# Patient Record
Sex: FEMALE | Race: BLACK OR AFRICAN AMERICAN | NOT HISPANIC OR LATINO | URBAN - METROPOLITAN AREA
[De-identification: names, ages, dates, MRNs, and addresses within clinical notes are randomized per-mention and may not be internally consistent; named-entity substitution may affect disease eponyms.]

---

## 2017-02-04 ENCOUNTER — INPATIENT (INPATIENT)
Facility: HOSPITAL | Age: 77
LOS: 2 days | Discharge: HOME | DRG: 872 | End: 2017-02-07
Attending: UROLOGY | Admitting: UROLOGY
Payer: MEDICARE

## 2017-02-04 VITALS
OXYGEN SATURATION: 98 % | DIASTOLIC BLOOD PRESSURE: 79 MMHG | RESPIRATION RATE: 20 BRPM | SYSTOLIC BLOOD PRESSURE: 137 MMHG | WEIGHT: 164.91 LBS | TEMPERATURE: 102 F | HEART RATE: 112 BPM

## 2017-02-04 DIAGNOSIS — N13.2 HYDRONEPHROSIS WITH RENAL AND URETERAL CALCULOUS OBSTRUCTION: ICD-10-CM

## 2017-02-04 DIAGNOSIS — A41.9 SEPSIS, UNSPECIFIED ORGANISM: ICD-10-CM

## 2017-02-04 LAB — LACTATE SERPL-SCNC: 2.2 MMOL/L — HIGH (ref 0.5–2)

## 2017-02-04 PROCEDURE — 74176 CT ABD & PELVIS W/O CONTRAST: CPT | Mod: 26

## 2017-02-04 PROCEDURE — 93010 ELECTROCARDIOGRAM REPORT: CPT

## 2017-02-04 PROCEDURE — 71020: CPT | Mod: 26

## 2017-02-04 PROCEDURE — 76376 3D RENDER W/INTRP POSTPROCES: CPT | Mod: 26

## 2017-02-04 PROCEDURE — 99285 EMERGENCY DEPT VISIT HI MDM: CPT | Mod: 25

## 2017-02-04 PROCEDURE — 50432 PLMT NEPHROSTOMY CATHETER: CPT | Mod: RT

## 2017-02-04 RX ORDER — MORPHINE SULFATE 50 MG/1
2 CAPSULE, EXTENDED RELEASE ORAL ONCE
Qty: 0 | Refills: 0 | Status: DISCONTINUED | OUTPATIENT
Start: 2017-02-04 | End: 2017-02-04

## 2017-02-04 RX ORDER — SODIUM CHLORIDE 9 MG/ML
1000 INJECTION INTRAMUSCULAR; INTRAVENOUS; SUBCUTANEOUS ONCE
Qty: 0 | Refills: 0 | Status: COMPLETED | OUTPATIENT
Start: 2017-02-04 | End: 2017-02-04

## 2017-02-04 RX ORDER — PIPERACILLIN AND TAZOBACTAM 4; .5 G/20ML; G/20ML
3.38 INJECTION, POWDER, LYOPHILIZED, FOR SOLUTION INTRAVENOUS ONCE
Qty: 0 | Refills: 0 | Status: COMPLETED | OUTPATIENT
Start: 2017-02-04 | End: 2017-02-04

## 2017-02-04 RX ORDER — PIPERACILLIN AND TAZOBACTAM 4; .5 G/20ML; G/20ML
3.38 INJECTION, POWDER, LYOPHILIZED, FOR SOLUTION INTRAVENOUS EVERY 6 HOURS
Qty: 0 | Refills: 0 | Status: DISCONTINUED | OUTPATIENT
Start: 2017-02-04 | End: 2017-02-06

## 2017-02-04 RX ORDER — AMLODIPINE BESYLATE 2.5 MG/1
2.5 TABLET ORAL DAILY
Qty: 0 | Refills: 0 | Status: DISCONTINUED | OUTPATIENT
Start: 2017-02-04 | End: 2017-02-05

## 2017-02-04 RX ORDER — ACETAMINOPHEN 500 MG
650 TABLET ORAL EVERY 6 HOURS
Qty: 0 | Refills: 0 | Status: DISCONTINUED | OUTPATIENT
Start: 2017-02-04 | End: 2017-02-07

## 2017-02-04 RX ORDER — ACETAMINOPHEN 500 MG
975 TABLET ORAL ONCE
Qty: 0 | Refills: 0 | Status: COMPLETED | OUTPATIENT
Start: 2017-02-04 | End: 2017-02-04

## 2017-02-04 RX ORDER — FAMOTIDINE 10 MG/ML
20 INJECTION INTRAVENOUS ONCE
Qty: 0 | Refills: 0 | Status: COMPLETED | OUTPATIENT
Start: 2017-02-04 | End: 2017-02-04

## 2017-02-04 RX ORDER — SODIUM CHLORIDE 9 MG/ML
1000 INJECTION INTRAMUSCULAR; INTRAVENOUS; SUBCUTANEOUS
Qty: 0 | Refills: 0 | Status: DISCONTINUED | OUTPATIENT
Start: 2017-02-04 | End: 2017-02-05

## 2017-02-04 RX ORDER — SODIUM CHLORIDE 9 MG/ML
500 INJECTION INTRAMUSCULAR; INTRAVENOUS; SUBCUTANEOUS ONCE
Qty: 0 | Refills: 0 | Status: COMPLETED | OUTPATIENT
Start: 2017-02-04 | End: 2017-02-04

## 2017-02-04 RX ORDER — SODIUM CHLORIDE 9 MG/ML
1000 INJECTION INTRAMUSCULAR; INTRAVENOUS; SUBCUTANEOUS
Qty: 0 | Refills: 0 | Status: DISCONTINUED | OUTPATIENT
Start: 2017-02-04 | End: 2017-02-04

## 2017-02-04 RX ORDER — KETOROLAC TROMETHAMINE 30 MG/ML
15 SYRINGE (ML) INJECTION EVERY 6 HOURS
Qty: 0 | Refills: 0 | Status: DISCONTINUED | OUTPATIENT
Start: 2017-02-04 | End: 2017-02-05

## 2017-02-04 RX ORDER — ONDANSETRON 8 MG/1
4 TABLET, FILM COATED ORAL EVERY 6 HOURS
Qty: 0 | Refills: 0 | Status: DISCONTINUED | OUTPATIENT
Start: 2017-02-04 | End: 2017-02-07

## 2017-02-04 RX ORDER — ONDANSETRON 8 MG/1
4 TABLET, FILM COATED ORAL ONCE
Qty: 0 | Refills: 0 | Status: COMPLETED | OUTPATIENT
Start: 2017-02-04 | End: 2017-02-04

## 2017-02-04 RX ADMIN — Medication 975 MILLIGRAM(S): at 03:30

## 2017-02-04 RX ADMIN — SODIUM CHLORIDE 100 MILLILITER(S): 9 INJECTION INTRAMUSCULAR; INTRAVENOUS; SUBCUTANEOUS at 06:28

## 2017-02-04 RX ADMIN — MORPHINE SULFATE 2 MILLIGRAM(S): 50 CAPSULE, EXTENDED RELEASE ORAL at 13:15

## 2017-02-04 RX ADMIN — ONDANSETRON 4 MILLIGRAM(S): 8 TABLET, FILM COATED ORAL at 03:00

## 2017-02-04 RX ADMIN — SODIUM CHLORIDE 125 MILLILITER(S): 9 INJECTION INTRAMUSCULAR; INTRAVENOUS; SUBCUTANEOUS at 08:20

## 2017-02-04 RX ADMIN — PIPERACILLIN AND TAZOBACTAM 200 GRAM(S): 4; .5 INJECTION, POWDER, LYOPHILIZED, FOR SOLUTION INTRAVENOUS at 15:52

## 2017-02-04 RX ADMIN — SODIUM CHLORIDE 125 MILLILITER(S): 9 INJECTION INTRAMUSCULAR; INTRAVENOUS; SUBCUTANEOUS at 18:39

## 2017-02-04 RX ADMIN — PIPERACILLIN AND TAZOBACTAM 200 GRAM(S): 4; .5 INJECTION, POWDER, LYOPHILIZED, FOR SOLUTION INTRAVENOUS at 10:45

## 2017-02-04 RX ADMIN — MORPHINE SULFATE 2 MILLIGRAM(S): 50 CAPSULE, EXTENDED RELEASE ORAL at 06:21

## 2017-02-04 RX ADMIN — SODIUM CHLORIDE 1000 MILLILITER(S): 9 INJECTION INTRAMUSCULAR; INTRAVENOUS; SUBCUTANEOUS at 08:50

## 2017-02-04 RX ADMIN — MORPHINE SULFATE 2 MILLIGRAM(S): 50 CAPSULE, EXTENDED RELEASE ORAL at 06:51

## 2017-02-04 RX ADMIN — PIPERACILLIN AND TAZOBACTAM 200 GRAM(S): 4; .5 INJECTION, POWDER, LYOPHILIZED, FOR SOLUTION INTRAVENOUS at 03:00

## 2017-02-04 RX ADMIN — SODIUM CHLORIDE 2000 MILLILITER(S): 9 INJECTION INTRAMUSCULAR; INTRAVENOUS; SUBCUTANEOUS at 03:00

## 2017-02-04 RX ADMIN — Medication 975 MILLIGRAM(S): at 03:00

## 2017-02-04 RX ADMIN — MORPHINE SULFATE 2 MILLIGRAM(S): 50 CAPSULE, EXTENDED RELEASE ORAL at 15:56

## 2017-02-04 RX ADMIN — PIPERACILLIN AND TAZOBACTAM 200 GRAM(S): 4; .5 INJECTION, POWDER, LYOPHILIZED, FOR SOLUTION INTRAVENOUS at 22:07

## 2017-02-04 RX ADMIN — FAMOTIDINE 20 MILLIGRAM(S): 10 INJECTION INTRAVENOUS at 03:00

## 2017-02-04 RX ADMIN — SODIUM CHLORIDE 2000 MILLILITER(S): 9 INJECTION INTRAMUSCULAR; INTRAVENOUS; SUBCUTANEOUS at 02:35

## 2017-02-04 NOTE — H&P ADULT. - HISTORY OF PRESENT ILLNESS
76 y.o. F patient with left flank pain x 1 day, nausea, vomiting, fevers.  No hematuria or dysuria.  No prior history of stones.

## 2017-02-04 NOTE — ED PROVIDER NOTE - OBJECTIVE STATEMENT
75 yo female in the ER with left flank pain, chills, c/o nausea and few episodes of vomiting earlier yesterday, also mentioned loose stool  x 1 days. Pt denies prior h/o kidney stones, denies dysuria, hematuria, bloody stool, denies cough or SOB

## 2017-02-04 NOTE — PROGRESS NOTE ADULT - SUBJECTIVE AND OBJECTIVE BOX
Interval Events:  Patient seen and examined at bedside.    MEDICATIONS:  Pulmonary:    Antimicrobials:  piperacillin/tazobactam IVPB. 3.375Gram(s) IV Intermittent every 6 hours    Anticoagulants:    Cardiac:  amLODIPine   Tablet 2.5milliGRAM(s) Oral daily    Endocrine:    Allergies    iodine containing compounds (Hives)    Intolerances        Vital Signs Last 24 Hrs  T(C): 38.4, Max: 39 ( @ 02:08)  T(F): 101.1, Max: 102.2 ( @ 02:08)  HR: 85 (85 - 112)  BP: 104/67 (94/56 - 137/79)  BP(mean): --  RR: 17 (17 - 20)  SpO2: 94% (94% - 98%)    I & Os for current day (as of  @ 17:48)  =============================================  IN: 500 ml / OUT: 140 ml / NET: 360 ml        LABS:      CBC Full  -  ( 2017 02:43 )  WBC Count : 4.8 K/uL  Hemoglobin : 14.3 g/dL  Hematocrit : 42.6 %  Platelet Count - Automated : 104 K/uL  Mean Cell Volume : 88.0 fL  Mean Cell Hemoglobin : 29.5 pg  Mean Cell Hemoglobin Concentration : 33.6 g/dL  Auto Neutrophil # : x  Auto Lymphocyte # : x  Auto Monocyte # : x  Auto Eosinophil # : x  Auto Basophil # : x  Auto Neutrophil % : 59.0 %  Auto Lymphocyte % : 1.0 %  Auto Monocyte % : 2.0 %  Auto Eosinophil % : x  Auto Basophil % : x    2017 02:43    142    |  108    |  18     ----------------------------<  111    4.5     |  24     |  1.12     Ca    8.4        2017 02:43    TPro  7.0    /  Alb  3.0    /  TBili  0.9    /  DBili  x      /  AST  28     /  ALT  26     /  AlkPhos  97     2017 02:43    PT/INR - ( 2017 02:43 )   PT: 12.9 sec;   INR: 1.16          PTT - ( 2017 02:43 )  PTT:25.6 sec      Urinalysis Basic - ( 2017 05:14 )    Color: Yellow / Appearance: Clear / S.015 / pH: x  Gluc: x / Ketone: NEGATIVE  / Bili: NEGATIVE / Urobili: 0.2 E.U./dL   Blood: x / Protein: NEGATIVE mg/dL / Nitrite: POSITIVE   Leuk Esterase: Small / RBC: > 10 /HPF / WBC Many /HPF   Sq Epi: x / Non Sq Epi: Few /HPF / Bacteria: Present /HPF      RADIOLOGY & ADDITIONAL STUDIES (The following images were personally reviewed):    PE  Gen: AAOx3, NAD, No complaints  Abd: soft, nontender, nondistended  : Interval Events:  Patient seen and examined at bedside. s/p L PCN placement by IR    MEDICATIONS:  Pulmonary:    Antimicrobials:  piperacillin/tazobactam IVPB. 3.375Gram(s) IV Intermittent every 6 hours    Anticoagulants:    Cardiac:  amLODIPine   Tablet 2.5milliGRAM(s) Oral daily    Endocrine:    Allergies    iodine containing compounds (Hives)    Intolerances        Vital Signs Last 24 Hrs  T(C): 38.4, Max: 39 ( @ 02:08)  T(F): 101.1, Max: 102.2 ( @ 02:08)  HR: 85 (85 - 112)  BP: 104/67 (94/56 - 137/79)  BP(mean): --  RR: 17 (17 - 20)  SpO2: 94% (94% - 98%)    I & Os for current day (as of  @ 17:48)  =============================================  IN: 500 ml / OUT: 140 ml / NET: 360 ml        LABS:      CBC Full  -  ( 2017 02:43 )  WBC Count : 4.8 K/uL  Hemoglobin : 14.3 g/dL  Hematocrit : 42.6 %  Platelet Count - Automated : 104 K/uL  Mean Cell Volume : 88.0 fL  Mean Cell Hemoglobin : 29.5 pg  Mean Cell Hemoglobin Concentration : 33.6 g/dL  Auto Neutrophil # : x  Auto Lymphocyte # : x  Auto Monocyte # : x  Auto Eosinophil # : x  Auto Basophil # : x  Auto Neutrophil % : 59.0 %  Auto Lymphocyte % : 1.0 %  Auto Monocyte % : 2.0 %  Auto Eosinophil % : x  Auto Basophil % : x    2017 02:43    142    |  108    |  18     ----------------------------<  111    4.5     |  24     |  1.12     Ca    8.4        2017 02:43    TPro  7.0    /  Alb  3.0    /  TBili  0.9    /  DBili  x      /  AST  28     /  ALT  26     /  AlkPhos  97     2017 02:43    PT/INR - ( 2017 02:43 )   PT: 12.9 sec;   INR: 1.16          PTT - ( 2017 02:43 )  PTT:25.6 sec      Urinalysis Basic - ( 2017 05:14 )    Color: Yellow / Appearance: Clear / S.015 / pH: x  Gluc: x / Ketone: NEGATIVE  / Bili: NEGATIVE / Urobili: 0.2 E.U./dL   Blood: x / Protein: NEGATIVE mg/dL / Nitrite: POSITIVE   Leuk Esterase: Small / RBC: > 10 /HPF / WBC Many /HPF   Sq Epi: x / Non Sq Epi: Few /HPF / Bacteria: Present /HPF      RADIOLOGY & ADDITIONAL STUDIES (The following images were personally reviewed):    PE  Gen: AAOx3, NAD, No complaints  Abd: soft, nontender, nondistended  :L pcn in place and draining, light pink urine

## 2017-02-04 NOTE — PROGRESS NOTE ADULT - PROBLEM SELECTOR PLAN 1
1: SCD's  2. Regular diet  3. IVF's  4. Blood culture- gram negative rods on Zosyn  5. Temp 101.2, tylenol as needed  4.Monitor vital signs and temperature overnight 1: SCD's  2. Regular diet  3. IVF's  4. Blood culture- gram negative rods on Zosyn  5. Temp 101.1, tylenol as needed  4.Monitor vital signs and temperature overnight

## 2017-02-04 NOTE — ED PROVIDER NOTE - MEDICAL DECISION MAKING DETAILS
75 yo female present to ER febrile, with shaking chills, c/o left flank pain, nausea and vomiting x 1 day. initial lactic acid- 3.7, septic work started, pt received 2L IV fluids, empiric broad spectrum abx( zosyn) , vital signs improved, 2nd lactic acid-2.2, pt improved. Abd/pelv. Ct scan findings  consistent with obstructing 6 mm stone in the proximal left ureter and significant perinephric stranding suggesting pyelonephritis. Pt seen and  evaluated by urology on call.

## 2017-02-04 NOTE — PROVIDER CONTACT NOTE (CRITICAL VALUE NOTIFICATION) - TEST AND RESULT REPORTED:
lactate 2.2
Bands 38%
Blood culture gram neg rods
Blood culture results aerobic and anaerobic, gram neg rods
Lactic 3.7

## 2017-02-04 NOTE — ED ADULT NURSE NOTE - OBJECTIVE STATEMENT
76 yr old female presents to the ed with c.o left lower flank pain radiating to left lower quadrant of abdomen since 12 noon. pt c.o nausea and vomiting. denies any burning or frequency in urination. denies any blood in urine. fever and chills since tonight. n distress noted. tameka zaman at bedside at this time. will continue to monitor.

## 2017-02-04 NOTE — ED PROVIDER NOTE - ATTENDING CONTRIBUTION TO CARE
76F hx htn, GERD, c/o fever and L flank pain.  states symptoms started earlier yesterday. +nausea, +vomiting. no dysuria. no cough.  no chest pain, no HA, no SOB.\  gen- nad  heent- ncat, clear conj  cv -rrr  lungs -ctab  abd - soft, nt, nd, L flank pain  ext -wwp, no edema  neuro -aox3, steady gait, mack  labs c/w sepsis, pt with obstructing infected stone, given zosyn, lactate improved with ivf.  to be admitted to urology service

## 2017-02-04 NOTE — PATIENT PROFILE ADULT. - VISION (WITH CORRECTIVE LENSES IF THE PATIENT USUALLY WEARS THEM):
Partially impaired: cannot see medication labels or newsprint, but can see obstacles in path, and the surrounding layout; can count fingers at arm's length/reading

## 2017-02-04 NOTE — H&P ADULT. - PMH
Essential hypertension  HTN (hypertension)  Gastroesophageal reflux disease  GERD (gastroesophageal reflux disease)  History of nutritional disorder  H/O vitamin D deficiency

## 2017-02-04 NOTE — H&P ADULT. - FAMILY HISTORY
<<-----Click on this checkbox to enter Family History Family history of cardiovascular disease, Family history of hypertension     Family history of malignant neoplasm of gastrointestinal tract, Family history of colon cancer     Mother  Still living? Unknown  Family history of malignant neoplasm of breast, Age at diagnosis: Age Unknown

## 2017-02-04 NOTE — ED PROVIDER NOTE - CARE PLAN
Principal Discharge DX:	Sepsis secondary to UTI  Secondary Diagnosis:	Kidney stone on left side  Secondary Diagnosis:	Pyelonephritis, acute

## 2017-02-04 NOTE — H&P ADULT. - ASSESSMENT
76 y.o. F patient with 6mm left proximal ureteral stone with moderate hydro.  Febrile in ED to 102.2   Received 2L fluid bolus, Zosyn IV.  Also with N/V.

## 2017-02-04 NOTE — H&P ADULT. - PROBLEM SELECTOR PLAN 1
-Admit to Dr Rangel  -I/O  -NPO  -C/W Zosyn  -Strain urine for stones  -IV fluids  -Pain control  -Possible OR for stenting

## 2017-02-05 LAB
ANION GAP SERPL CALC-SCNC: 9 MMOL/L — SIGNIFICANT CHANGE UP (ref 9–16)
BUN SERPL-MCNC: 16 MG/DL — SIGNIFICANT CHANGE UP (ref 7–23)
CALCIUM SERPL-MCNC: 7.6 MG/DL — LOW (ref 8.5–10.5)
CHLORIDE SERPL-SCNC: 116 MMOL/L — HIGH (ref 96–108)
CO2 SERPL-SCNC: 19 MMOL/L — LOW (ref 22–31)
CREAT SERPL-MCNC: 1.11 MG/DL — SIGNIFICANT CHANGE UP (ref 0.5–1.3)
GLUCOSE SERPL-MCNC: 75 MG/DL — SIGNIFICANT CHANGE UP (ref 70–99)
HCT VFR BLD CALC: 38.3 % — SIGNIFICANT CHANGE UP (ref 34.5–45)
HGB BLD-MCNC: 12.5 G/DL — SIGNIFICANT CHANGE UP (ref 11.5–15.5)
LACTATE SERPL-SCNC: 1.4 MMOL/L — SIGNIFICANT CHANGE UP (ref 0.5–2)
MAGNESIUM SERPL-MCNC: 1.8 MG/DL — SIGNIFICANT CHANGE UP (ref 1.6–2.4)
MCHC RBC-ENTMCNC: 29.5 PG — SIGNIFICANT CHANGE UP (ref 27–34)
MCHC RBC-ENTMCNC: 32.6 G/DL — SIGNIFICANT CHANGE UP (ref 32–36)
MCV RBC AUTO: 90.3 FL — SIGNIFICANT CHANGE UP (ref 80–100)
PHOSPHATE SERPL-MCNC: 2.6 MG/DL — SIGNIFICANT CHANGE UP (ref 2.5–4.5)
PLATELET # BLD AUTO: 84 K/UL — LOW (ref 150–400)
POTASSIUM SERPL-MCNC: 3.7 MMOL/L — SIGNIFICANT CHANGE UP (ref 3.5–5.3)
POTASSIUM SERPL-SCNC: 3.7 MMOL/L — SIGNIFICANT CHANGE UP (ref 3.5–5.3)
RBC # BLD: 4.24 M/UL — SIGNIFICANT CHANGE UP (ref 3.8–5.2)
RBC # FLD: 14.5 % — SIGNIFICANT CHANGE UP (ref 10.3–16.9)
SODIUM SERPL-SCNC: 144 MMOL/L — SIGNIFICANT CHANGE UP (ref 135–145)
WBC # BLD: 17 K/UL — HIGH (ref 3.8–10.5)
WBC # FLD AUTO: 17 K/UL — HIGH (ref 3.8–10.5)

## 2017-02-05 RX ORDER — SODIUM CHLORIDE 9 MG/ML
1000 INJECTION INTRAMUSCULAR; INTRAVENOUS; SUBCUTANEOUS
Qty: 0 | Refills: 0 | Status: DISCONTINUED | OUTPATIENT
Start: 2017-02-05 | End: 2017-02-06

## 2017-02-05 RX ORDER — AMLODIPINE BESYLATE 2.5 MG/1
2.5 TABLET ORAL AT BEDTIME
Qty: 0 | Refills: 0 | Status: DISCONTINUED | OUTPATIENT
Start: 2017-02-05 | End: 2017-02-07

## 2017-02-05 RX ORDER — MAGNESIUM SULFATE 500 MG/ML
1 VIAL (ML) INJECTION ONCE
Qty: 0 | Refills: 0 | Status: COMPLETED | OUTPATIENT
Start: 2017-02-05 | End: 2017-02-05

## 2017-02-05 RX ADMIN — Medication 650 MILLIGRAM(S): at 06:12

## 2017-02-05 RX ADMIN — Medication 100 GRAM(S): at 13:46

## 2017-02-05 RX ADMIN — Medication 15 MILLIGRAM(S): at 13:45

## 2017-02-05 RX ADMIN — PIPERACILLIN AND TAZOBACTAM 200 GRAM(S): 4; .5 INJECTION, POWDER, LYOPHILIZED, FOR SOLUTION INTRAVENOUS at 06:08

## 2017-02-05 RX ADMIN — PIPERACILLIN AND TAZOBACTAM 200 GRAM(S): 4; .5 INJECTION, POWDER, LYOPHILIZED, FOR SOLUTION INTRAVENOUS at 23:23

## 2017-02-05 RX ADMIN — AMLODIPINE BESYLATE 2.5 MILLIGRAM(S): 2.5 TABLET ORAL at 06:09

## 2017-02-05 RX ADMIN — PIPERACILLIN AND TAZOBACTAM 200 GRAM(S): 4; .5 INJECTION, POWDER, LYOPHILIZED, FOR SOLUTION INTRAVENOUS at 17:32

## 2017-02-05 RX ADMIN — AMLODIPINE BESYLATE 2.5 MILLIGRAM(S): 2.5 TABLET ORAL at 21:08

## 2017-02-05 RX ADMIN — Medication 15 MILLIGRAM(S): at 14:00

## 2017-02-05 RX ADMIN — PIPERACILLIN AND TAZOBACTAM 200 GRAM(S): 4; .5 INJECTION, POWDER, LYOPHILIZED, FOR SOLUTION INTRAVENOUS at 12:16

## 2017-02-05 RX ADMIN — SODIUM CHLORIDE 80 MILLILITER(S): 9 INJECTION INTRAMUSCULAR; INTRAVENOUS; SUBCUTANEOUS at 08:30

## 2017-02-05 NOTE — PROGRESS NOTE ADULT - SUBJECTIVE AND OBJECTIVE BOX
AM Note    No acute events overnight.      Vital Signs Last 24 Hrs  T(C): 37.1, Max: 38.4 (02-04 @ 14:47)  T(F): 98.7, Max: 101.2 (02-04 @ 14:47)  HR: 71 (71 - 95)  BP: 117/73 (94/56 - 137/75)  BP(mean): --  RR: 17 (16 - 17)  SpO2: 95% (94% - 95%)                          12.5   17.0  )-----------( 84       ( 05 Feb 2017 05:56 )             38.3        05 Feb 2017 05:55    144    |  116    |  16     ----------------------------<  75     3.7     |  19     |  1.11     Ca    7.6        05 Feb 2017 05:55  Phos  2.6       05 Feb 2017 05:55  Mg     1.8       05 Feb 2017 05:55    TPro  7.0    /  Alb  3.0    /  TBili  0.9    /  DBili  x      /  AST  28     /  ALT  26     /  AlkPhos  97     04 Feb 2017 02:43        I&O's Summary    I & Os for current day (as of 05 Feb 2017 07:15)  =============================================  IN: 1100 ml / OUT: 1165 ml / NET: -65 ml        PHYSICAL EXAM:    GEN:  ABD:  :  AM Note    No acute events overnight.      Vital Signs Last 24 Hrs  T(C): 37.1, Max: 38.4 (02-04 @ 14:47)  T(F): 98.7, Max: 101.2 (02-04 @ 14:47)  HR: 71 (71 - 95)  BP: 117/73 (94/56 - 137/75)  BP(mean): --  RR: 17 (16 - 17)  SpO2: 95% (94% - 95%)                          12.5   17.0  )-----------( 84       ( 05 Feb 2017 05:56 )             38.3        05 Feb 2017 05:55    144    |  116    |  16     ----------------------------<  75     3.7     |  19     |  1.11     Ca    7.6        05 Feb 2017 05:55  Phos  2.6       05 Feb 2017 05:55  Mg     1.8       05 Feb 2017 05:55    TPro  7.0    /  Alb  3.0    /  TBili  0.9    /  DBili  x      /  AST  28     /  ALT  26     /  AlkPhos  97     04 Feb 2017 02:43        I&O's Summary    I & Os for current day (as of 05 Feb 2017 07:15)  =============================================  IN: 1100 ml / OUT: 1165 ml / NET: -65 ml        PHYSICAL EXAM:    GEN: alert and awake  ABD: soft, NT, nD  : L PCN intact, urine clear

## 2017-02-06 LAB
-  AMPICILLIN/SULBACTAM: SIGNIFICANT CHANGE UP
-  AMPICILLIN: SIGNIFICANT CHANGE UP
-  CEFAZOLIN: SIGNIFICANT CHANGE UP
-  CEFTRIAXONE: SIGNIFICANT CHANGE UP
-  CIPROFLOXACIN: SIGNIFICANT CHANGE UP
-  GENTAMICIN: SIGNIFICANT CHANGE UP
-  PIPERACILLIN/TAZOBACTAM: SIGNIFICANT CHANGE UP
-  TOBRAMYCIN: SIGNIFICANT CHANGE UP
-  TRIMETHOPRIM/SULFAMETHOXAZOLE: SIGNIFICANT CHANGE UP
ANION GAP SERPL CALC-SCNC: 9 MMOL/L — SIGNIFICANT CHANGE UP (ref 9–16)
BUN SERPL-MCNC: 9 MG/DL — SIGNIFICANT CHANGE UP (ref 7–23)
CALCIUM SERPL-MCNC: 7.8 MG/DL — LOW (ref 8.5–10.5)
CHLORIDE SERPL-SCNC: 114 MMOL/L — HIGH (ref 96–108)
CO2 SERPL-SCNC: 22 MMOL/L — SIGNIFICANT CHANGE UP (ref 22–31)
CREAT SERPL-MCNC: 0.95 MG/DL — SIGNIFICANT CHANGE UP (ref 0.5–1.3)
CULTURE RESULTS: NO GROWTH — SIGNIFICANT CHANGE UP
GLUCOSE SERPL-MCNC: 101 MG/DL — HIGH (ref 70–99)
HCT VFR BLD CALC: 37.7 % — SIGNIFICANT CHANGE UP (ref 34.5–45)
HGB BLD-MCNC: 12.5 G/DL — SIGNIFICANT CHANGE UP (ref 11.5–15.5)
MAGNESIUM SERPL-MCNC: 2.1 MG/DL — SIGNIFICANT CHANGE UP (ref 1.6–2.4)
MCHC RBC-ENTMCNC: 29.8 PG — SIGNIFICANT CHANGE UP (ref 27–34)
MCHC RBC-ENTMCNC: 33.2 G/DL — SIGNIFICANT CHANGE UP (ref 32–36)
MCV RBC AUTO: 89.8 FL — SIGNIFICANT CHANGE UP (ref 80–100)
METHOD TYPE: SIGNIFICANT CHANGE UP
PHOSPHATE SERPL-MCNC: 2.2 MG/DL — LOW (ref 2.5–4.5)
PLATELET # BLD AUTO: 84 K/UL — LOW (ref 150–400)
POTASSIUM SERPL-MCNC: 3.3 MMOL/L — LOW (ref 3.5–5.3)
POTASSIUM SERPL-SCNC: 3.3 MMOL/L — LOW (ref 3.5–5.3)
RBC # BLD: 4.2 M/UL — SIGNIFICANT CHANGE UP (ref 3.8–5.2)
RBC # FLD: 14.5 % — SIGNIFICANT CHANGE UP (ref 10.3–16.9)
SODIUM SERPL-SCNC: 145 MMOL/L — SIGNIFICANT CHANGE UP (ref 135–145)
SPECIMEN SOURCE: SIGNIFICANT CHANGE UP
WBC # BLD: 14.3 K/UL — HIGH (ref 3.8–10.5)
WBC # FLD AUTO: 14.3 K/UL — HIGH (ref 3.8–10.5)

## 2017-02-06 RX ORDER — SODIUM,POTASSIUM PHOSPHATES 278-250MG
1 POWDER IN PACKET (EA) ORAL ONCE
Qty: 0 | Refills: 0 | Status: COMPLETED | OUTPATIENT
Start: 2017-02-06 | End: 2017-02-06

## 2017-02-06 RX ORDER — POTASSIUM CHLORIDE 20 MEQ
40 PACKET (EA) ORAL ONCE
Qty: 0 | Refills: 0 | Status: COMPLETED | OUTPATIENT
Start: 2017-02-06 | End: 2017-02-06

## 2017-02-06 RX ADMIN — Medication 1 TABLET(S): at 11:05

## 2017-02-06 RX ADMIN — PIPERACILLIN AND TAZOBACTAM 200 GRAM(S): 4; .5 INJECTION, POWDER, LYOPHILIZED, FOR SOLUTION INTRAVENOUS at 05:21

## 2017-02-06 RX ADMIN — AMLODIPINE BESYLATE 2.5 MILLIGRAM(S): 2.5 TABLET ORAL at 22:02

## 2017-02-06 RX ADMIN — Medication 40 MILLIEQUIVALENT(S): at 11:05

## 2017-02-06 RX ADMIN — Medication 1 TABLET(S): at 17:34

## 2017-02-06 RX ADMIN — SODIUM CHLORIDE 80 MILLILITER(S): 9 INJECTION INTRAMUSCULAR; INTRAVENOUS; SUBCUTANEOUS at 14:01

## 2017-02-06 RX ADMIN — SODIUM CHLORIDE 80 MILLILITER(S): 9 INJECTION INTRAMUSCULAR; INTRAVENOUS; SUBCUTANEOUS at 05:21

## 2017-02-06 NOTE — PROGRESS NOTE ADULT - PROBLEM SELECTOR PROBLEM 1
Ureteral stone with hydronephrosis

## 2017-02-06 NOTE — PROGRESS NOTE ADULT - SUBJECTIVE AND OBJECTIVE BOX
INTERVAL HPI/OVERNIGHT EVENTS: none, feeling better, no N/V, tolerates PO    MEDICATIONS  (STANDING):  piperacillin/tazobactam IVPB. 3.375Gram(s) IV Intermittent every 6 hours  sodium chloride 0.9%. 1000milliLiter(s) IV Continuous <Continuous>  amLODIPine   Tablet 2.5milliGRAM(s) Oral at bedtime    MEDICATIONS  (PRN):  acetaminophen   Tablet 650milliGRAM(s) Oral every 6 hours PRN For Temp greater than 38.5 C (101.3 F)  ketorolac   Injectable 15milliGRAM(s) IV Push every 6 hours PRN Moderate Pain  ondansetron Injectable 4milliGRAM(s) IV Push every 6 hours PRN Nausea and/or Vomiting  oxyCODONE  5 mG/acetaminophen 325 mG 1Tablet(s) Oral every 4 hours PRN Moderate Pain (4 - 6)      Allergies    iodine containing compounds (Hives)    Intolerances        Vital Signs Last 24 Hrs  T(C): 36.7, Max: 37.4 (02-06 @ 00:16)  T(F): 98.1, Max: 99.3 (02-06 @ 00:16)  HR: 67 (64 - 76)  BP: 131/78 (95/62 - 145/78)  BP(mean): --  RR: 17 (16 - 17)  SpO2: 95% (92% - 95%)    UO: L PCN: 650 cc  BRP: 100 cc     ON PE:  General: alert and awake  Abdomen: soft, NT, ND  : L PCN intact, urine clear    LABS:                        12.5   17.0  )-----------( 84       ( 05 Feb 2017 05:56 )             38.3     05 Feb 2017 05:55    144    |  116    |  16     ----------------------------<  75     3.7     |  19     |  1.11     Ca    7.6        05 Feb 2017 05:55  Phos  2.6       05 Feb 2017 05:55  Mg     1.8       05 Feb 2017 05:55            RADIOLOGY & ADDITIONAL TESTS:

## 2017-02-07 VITALS
OXYGEN SATURATION: 96 % | TEMPERATURE: 98 F | HEART RATE: 78 BPM | SYSTOLIC BLOOD PRESSURE: 116 MMHG | RESPIRATION RATE: 16 BRPM

## 2017-02-07 LAB
-  AMPICILLIN/SULBACTAM: SIGNIFICANT CHANGE UP
-  AMPICILLIN: SIGNIFICANT CHANGE UP
-  AMPICILLIN: SIGNIFICANT CHANGE UP
-  CEFAZOLIN: SIGNIFICANT CHANGE UP
-  CEFTRIAXONE: SIGNIFICANT CHANGE UP
-  CIPROFLOXACIN: SIGNIFICANT CHANGE UP
-  CIPROFLOXACIN: SIGNIFICANT CHANGE UP
-  GENTAMICIN: SIGNIFICANT CHANGE UP
-  NITROFURANTOIN: SIGNIFICANT CHANGE UP
-  PIPERACILLIN/TAZOBACTAM: SIGNIFICANT CHANGE UP
-  TETRACYCLINE: SIGNIFICANT CHANGE UP
-  TOBRAMYCIN: SIGNIFICANT CHANGE UP
-  TRIMETHOPRIM/SULFAMETHOXAZOLE: SIGNIFICANT CHANGE UP
ANION GAP SERPL CALC-SCNC: 11 MMOL/L — SIGNIFICANT CHANGE UP (ref 9–16)
BUN SERPL-MCNC: 6 MG/DL — LOW (ref 7–23)
CALCIUM SERPL-MCNC: 8.7 MG/DL — SIGNIFICANT CHANGE UP (ref 8.5–10.5)
CHLORIDE SERPL-SCNC: 112 MMOL/L — HIGH (ref 96–108)
CO2 SERPL-SCNC: 21 MMOL/L — LOW (ref 22–31)
CREAT SERPL-MCNC: 0.82 MG/DL — SIGNIFICANT CHANGE UP (ref 0.5–1.3)
CULTURE RESULTS: SIGNIFICANT CHANGE UP
CULTURE RESULTS: SIGNIFICANT CHANGE UP
GLUCOSE SERPL-MCNC: 96 MG/DL — SIGNIFICANT CHANGE UP (ref 70–99)
HCT VFR BLD CALC: 41.1 % — SIGNIFICANT CHANGE UP (ref 34.5–45)
HGB BLD-MCNC: 13.7 G/DL — SIGNIFICANT CHANGE UP (ref 11.5–15.5)
MAGNESIUM SERPL-MCNC: 1.9 MG/DL — SIGNIFICANT CHANGE UP (ref 1.6–2.4)
MCHC RBC-ENTMCNC: 29.8 PG — SIGNIFICANT CHANGE UP (ref 27–34)
MCHC RBC-ENTMCNC: 33.3 G/DL — SIGNIFICANT CHANGE UP (ref 32–36)
MCV RBC AUTO: 89.5 FL — SIGNIFICANT CHANGE UP (ref 80–100)
METHOD TYPE: SIGNIFICANT CHANGE UP
METHOD TYPE: SIGNIFICANT CHANGE UP
ORGANISM # SPEC MICROSCOPIC CNT: SIGNIFICANT CHANGE UP
PHOSPHATE SERPL-MCNC: 2.8 MG/DL — SIGNIFICANT CHANGE UP (ref 2.5–4.5)
PLATELET # BLD AUTO: 104 K/UL — LOW (ref 150–400)
POTASSIUM SERPL-MCNC: 3.4 MMOL/L — LOW (ref 3.5–5.3)
POTASSIUM SERPL-SCNC: 3.4 MMOL/L — LOW (ref 3.5–5.3)
RBC # BLD: 4.59 M/UL — SIGNIFICANT CHANGE UP (ref 3.8–5.2)
RBC # FLD: 14.4 % — SIGNIFICANT CHANGE UP (ref 10.3–16.9)
SODIUM SERPL-SCNC: 144 MMOL/L — SIGNIFICANT CHANGE UP (ref 135–145)
SPECIMEN SOURCE: SIGNIFICANT CHANGE UP
SPECIMEN SOURCE: SIGNIFICANT CHANGE UP
WBC # BLD: 9.7 K/UL — SIGNIFICANT CHANGE UP (ref 3.8–10.5)
WBC # FLD AUTO: 9.7 K/UL — SIGNIFICANT CHANGE UP (ref 3.8–10.5)

## 2017-02-07 PROCEDURE — C1769: CPT

## 2017-02-07 PROCEDURE — 87186 SC STD MICRODIL/AGAR DIL: CPT

## 2017-02-07 PROCEDURE — 93005 ELECTROCARDIOGRAM TRACING: CPT

## 2017-02-07 PROCEDURE — 85730 THROMBOPLASTIN TIME PARTIAL: CPT

## 2017-02-07 PROCEDURE — 99285 EMERGENCY DEPT VISIT HI MDM: CPT | Mod: 25

## 2017-02-07 PROCEDURE — 96374 THER/PROPH/DIAG INJ IV PUSH: CPT

## 2017-02-07 PROCEDURE — 50432 PLMT NEPHROSTOMY CATHETER: CPT

## 2017-02-07 PROCEDURE — 80053 COMPREHEN METABOLIC PANEL: CPT

## 2017-02-07 PROCEDURE — 81001 URINALYSIS AUTO W/SCOPE: CPT

## 2017-02-07 PROCEDURE — 83605 ASSAY OF LACTIC ACID: CPT

## 2017-02-07 PROCEDURE — 87040 BLOOD CULTURE FOR BACTERIA: CPT

## 2017-02-07 PROCEDURE — 87086 URINE CULTURE/COLONY COUNT: CPT

## 2017-02-07 PROCEDURE — C1729: CPT

## 2017-02-07 PROCEDURE — 85027 COMPLETE CBC AUTOMATED: CPT

## 2017-02-07 PROCEDURE — 84100 ASSAY OF PHOSPHORUS: CPT

## 2017-02-07 PROCEDURE — 96375 TX/PRO/DX INJ NEW DRUG ADDON: CPT

## 2017-02-07 PROCEDURE — 83735 ASSAY OF MAGNESIUM: CPT

## 2017-02-07 PROCEDURE — 80048 BASIC METABOLIC PNL TOTAL CA: CPT

## 2017-02-07 PROCEDURE — 87070 CULTURE OTHR SPECIMN AEROBIC: CPT

## 2017-02-07 PROCEDURE — 74176 CT ABD & PELVIS W/O CONTRAST: CPT

## 2017-02-07 PROCEDURE — 85610 PROTHROMBIN TIME: CPT

## 2017-02-07 PROCEDURE — 81003 URINALYSIS AUTO W/O SCOPE: CPT

## 2017-02-07 PROCEDURE — C1894: CPT

## 2017-02-07 PROCEDURE — 71046 X-RAY EXAM CHEST 2 VIEWS: CPT

## 2017-02-07 PROCEDURE — 36415 COLL VENOUS BLD VENIPUNCTURE: CPT

## 2017-02-07 PROCEDURE — 85025 COMPLETE CBC W/AUTO DIFF WBC: CPT

## 2017-02-07 RX ORDER — DOCUSATE SODIUM 100 MG
1 CAPSULE ORAL
Qty: 12 | Refills: 0
Start: 2017-02-07 | End: 2017-02-13

## 2017-02-07 RX ORDER — POTASSIUM CHLORIDE 20 MEQ
40 PACKET (EA) ORAL ONCE
Qty: 0 | Refills: 0 | Status: DISCONTINUED | OUTPATIENT
Start: 2017-02-07 | End: 2017-02-07

## 2017-02-07 RX ORDER — OXYCODONE HYDROCHLORIDE 5 MG/1
1 TABLET ORAL
Qty: 12 | Refills: 0
Start: 2017-02-07 | End: 2017-02-09

## 2017-02-07 RX ORDER — MAGNESIUM OXIDE 400 MG ORAL TABLET 241.3 MG
400 TABLET ORAL ONCE
Qty: 0 | Refills: 0 | Status: DISCONTINUED | OUTPATIENT
Start: 2017-02-07 | End: 2017-02-07

## 2017-02-07 RX ADMIN — Medication 1 TABLET(S): at 06:19

## 2017-02-07 NOTE — PROGRESS NOTE ADULT - SUBJECTIVE AND OBJECTIVE BOX
AM NOTE    Patient is a 76y old  Female who presents with a chief complaint of Ureteral stone (04 Feb 2017 07:23) s/p Left PCN placement by IR 2/4    no acute events o/n      Vital Signs Last 24 Hrs  T(C): 37.1, Max: 37.3 (02-06 @ 17:05)  T(F): 98.8, Max: 99.2 (02-06 @ 17:05)  HR: 77 (70 - 79)  BP: 137/82 (132/81 - 148/82)  BP(mean): --  RR: 16 (16 - 17)  SpO2: 97% (93% - 98%)    I&O's Summary  I & Os for 24h ending 06 Feb 2017 07:00  =============================================  IN: 3580 ml / OUT: 2200 ml / NET: 1380 ml    I & Os for current day (as of 07 Feb 2017 06:33)  =============================================  IN: 2340 ml / OUT: 3400 ml / NET: -1060 ml      Gen: NAD    Abd: soft, NTND    : L PCN in place draining clear yellow urine; BRP                          12.5   14.3  )-----------( 84       ( 06 Feb 2017 07:03 )             37.7     06 Feb 2017 07:23    145    |  114    |  9      ----------------------------<  101    3.3     |  22     |  0.95     Ca    7.8        06 Feb 2017 07:23  Phos  2.2       06 Feb 2017 07:23  Mg     2.1       06 Feb 2017 07:23      culturesCulture Results:   1,000 CFU/ml Gram Negative Rods  Identification and susceptibility to follow. (02-04 @ 19:23)  Culture Results:   50,000 CFU/ml Escherichia coli  Culture in progress (02-04 @ 19:23)  Culture Results:   No growth (02-04 @ 19:23)  Culture Results:   Gram Negative Rods  Identification and susceptibility to follow. (02-04 @ 07:10)  Culture Results:   Growth in aerobic and anaerobic bottles: Escherichia coli (02-04 @ 05:49)  Culture Results:   Growth in aerobic and anaerobic bottles: Escherichia coli (02-04 @ 05:49)      A/P  76F s/p L PCN  1. cont abx (sens to Bactrim)  2. monitor UO  3. OOB/IS  4. fu Ucxs  5. diet: reg  6. pain meds PRN  7. DVT ppx

## 2017-02-07 NOTE — DISCHARGE NOTE ADULT - HOSPITAL COURSE
76F history 6mm left ureteral stone with hydro underwent L Nephrostomy placement 2/6. Tolerated procedure well. VSS, afebrile and hemodynamically stable

## 2017-02-07 NOTE — DISCHARGE NOTE ADULT - CARE PROVIDER_API CALL
Radha Varner), Urology  [address], [place] [address]  Phone: [phone]  Fax: [phone]

## 2017-02-07 NOTE — DISCHARGE NOTE ADULT - NS MD DC FALL RISK RISK
For information on Fall & Injury Prevention, visit [email or web address]

## 2017-02-07 NOTE — DISCHARGE NOTE ADULT - PATIENT PORTAL LINK FT
“You can access the [facility] Patient Portal, offered by Health system, by registering with the following website: [email or web address]”

## 2017-02-07 NOTE — DISCHARGE NOTE ADULT - PLAN OF CARE
improvement after surgery regular diet, activity as tolerated, nephrostomy to gravity- no irrigation needed. If fever >100.4 or any change or worsening of symptoms please call doctor or report to ED. Make followup appointment with Dr churchill call office

## 2017-02-07 NOTE — DISCHARGE NOTE ADULT - MEDICATION SUMMARY - MEDICATIONS TO TAKE
I will START or STAY ON the medications listed below when I get home from the hospital:    acetaminophen-oxyCODONE 325 mg-5 mg oral tablet  -- 1 tab(s) by mouth every 4 hours, As needed, Moderate Pain (4 - 6) MDD:4  -- Indication: For for pain    loratadine  --  by mouth   -- Indication: For home med    amLODIPine 2.5 mg oral tablet  -- 1 tab(s) by mouth once a day  -- Indication: For home med    raNITIdine  --  by mouth   -- Indication: For home med    Colace sodium 100 mg oral capsule  -- 1 cap(s) by mouth 2 times a day, As Needed -for constipation  -- Medication should be taken with plenty of water.    -- Indication: For for constipation    sulfamethoxazole-trimethoprim 800 mg-160 mg oral tablet  -- 1 tab(s) by mouth 2 times a day  -- Indication: For infection ppx

## 2017-02-09 DIAGNOSIS — N13.2 HYDRONEPHROSIS WITH RENAL AND URETERAL CALCULOUS OBSTRUCTION: ICD-10-CM

## 2017-02-09 DIAGNOSIS — K21.9 GASTRO-ESOPHAGEAL REFLUX DISEASE WITHOUT ESOPHAGITIS: ICD-10-CM

## 2017-02-09 DIAGNOSIS — I10 ESSENTIAL (PRIMARY) HYPERTENSION: ICD-10-CM

## 2017-02-27 ENCOUNTER — OUTPATIENT (OUTPATIENT)
Dept: OUTPATIENT SERVICES | Facility: HOSPITAL | Age: 77
LOS: 1 days | End: 2017-02-27
Payer: MEDICARE

## 2017-02-27 PROCEDURE — 74176 CT ABD & PELVIS W/O CONTRAST: CPT

## 2017-02-27 PROCEDURE — 74176 CT ABD & PELVIS W/O CONTRAST: CPT | Mod: 26

## 2017-03-27 ENCOUNTER — OUTPATIENT (OUTPATIENT)
Dept: OUTPATIENT SERVICES | Facility: HOSPITAL | Age: 77
LOS: 1 days | End: 2017-03-27
Payer: MEDICARE

## 2017-03-27 PROCEDURE — 50435 EXCHANGE NEPHROSTOMY CATH: CPT

## 2017-03-27 PROCEDURE — 50435 EXCHANGE NEPHROSTOMY CATH: CPT | Mod: LT

## 2017-03-27 PROCEDURE — C1729: CPT

## 2017-03-27 PROCEDURE — C1769: CPT

## 2017-03-27 RX ORDER — CEFAZOLIN SODIUM 1 G
2000 VIAL (EA) INJECTION ONCE
Qty: 0 | Refills: 0 | Status: DISCONTINUED | OUTPATIENT
Start: 2017-03-27 | End: 2017-04-11

## 2017-04-20 ENCOUNTER — EMERGENCY (EMERGENCY)
Facility: HOSPITAL | Age: 77
LOS: 1 days | Discharge: HOME | End: 2017-04-20
Attending: EMERGENCY MEDICINE | Admitting: EMERGENCY MEDICINE
Payer: MEDICARE

## 2017-04-20 VITALS
HEART RATE: 67 BPM | OXYGEN SATURATION: 97 % | TEMPERATURE: 98 F | DIASTOLIC BLOOD PRESSURE: 82 MMHG | SYSTOLIC BLOOD PRESSURE: 144 MMHG | RESPIRATION RATE: 18 BRPM

## 2017-04-20 VITALS
DIASTOLIC BLOOD PRESSURE: 87 MMHG | TEMPERATURE: 98 F | RESPIRATION RATE: 18 BRPM | SYSTOLIC BLOOD PRESSURE: 135 MMHG | OXYGEN SATURATION: 96 % | HEART RATE: 72 BPM

## 2017-04-20 DIAGNOSIS — R82.90 UNSPECIFIED ABNORMAL FINDINGS IN URINE: ICD-10-CM

## 2017-04-20 DIAGNOSIS — Z79.899 OTHER LONG TERM (CURRENT) DRUG THERAPY: ICD-10-CM

## 2017-04-20 DIAGNOSIS — Z88.8 ALLERGY STATUS TO OTHER DRUGS, MEDICAMENTS AND BIOLOGICAL SUBSTANCES STATUS: ICD-10-CM

## 2017-04-20 DIAGNOSIS — N39.0 URINARY TRACT INFECTION, SITE NOT SPECIFIED: ICD-10-CM

## 2017-04-20 DIAGNOSIS — I10 ESSENTIAL (PRIMARY) HYPERTENSION: ICD-10-CM

## 2017-04-20 PROBLEM — Z00.00 ENCOUNTER FOR PREVENTIVE HEALTH EXAMINATION: Status: ACTIVE | Noted: 2017-04-20

## 2017-04-20 LAB
APPEARANCE UR: CLEAR — SIGNIFICANT CHANGE UP
BILIRUB UR-MCNC: NEGATIVE — SIGNIFICANT CHANGE UP
COLOR SPEC: YELLOW — SIGNIFICANT CHANGE UP
DIFF PNL FLD: NEGATIVE — SIGNIFICANT CHANGE UP
GLUCOSE UR QL: NEGATIVE — SIGNIFICANT CHANGE UP
KETONES UR-MCNC: NEGATIVE — SIGNIFICANT CHANGE UP
LEUKOCYTE ESTERASE UR-ACNC: (no result)
NITRITE UR-MCNC: NEGATIVE — SIGNIFICANT CHANGE UP
PH UR: 6 — SIGNIFICANT CHANGE UP (ref 5–8)
PROT UR-MCNC: NEGATIVE MG/DL — SIGNIFICANT CHANGE UP
SP GR SPEC: <=1.005 — SIGNIFICANT CHANGE UP (ref 1–1.03)
UROBILINOGEN FLD QL: 0.2 E.U./DL — SIGNIFICANT CHANGE UP

## 2017-04-20 PROCEDURE — 99283 EMERGENCY DEPT VISIT LOW MDM: CPT

## 2017-04-20 PROCEDURE — 81001 URINALYSIS AUTO W/SCOPE: CPT

## 2017-04-20 PROCEDURE — 87086 URINE CULTURE/COLONY COUNT: CPT

## 2017-04-20 NOTE — ED PROVIDER NOTE - MEDICAL DECISION MAKING DETAILS
dw patient and Dr Varner's PA.  not sure if urine culture growth is high enough bacterial count to continue new abx.  pt already on multiple courses abx.  no fever or pain.  Dr Varner recommends pt to fu ID.

## 2017-04-20 NOTE — ED PROVIDER NOTE - OBJECTIVE STATEMENT
persistent positive urine cultures persistent positive urine cultures  has kidney stone, nephrostomy tube placed on left Dr Varner, since then still having positive urine culture.  pt finished cipro about 8 days ago, Dr Varner's office said most recent urine culture was positive but have not put her on abx, pt is worried she could get infection

## 2017-04-20 NOTE — ED ADULT TRIAGE NOTE - CHIEF COMPLAINT QUOTE
I have a chronic UTI with a renal catheter to drain my kidneys.  I have a stone but they can't remove it because of the UTI.

## 2017-04-20 NOTE — ED ADULT NURSE NOTE - OBJECTIVE STATEMENT
Pt presents to ED c/o UTI. Pt presents to ED c/o UTI. Pt reports PMH L sided kidney stone Pt presents to ED c/o UTI. Pt reports PMH L sided kidney stone with L nephrostomy tube placement, reports she is pending surgery but has had consistent UTIs and urology will not operate until UTI clears. Pt reports being on 3 courses of abx over the last few months, most recently cipro which she completed in early april and gave another urine sample at her PMD's office. Pt reports she received the results of that UA and "I still have a UTI but they haven't told me if I should be on any new abx and I'm worried that I have the UTI and it's not being treated." On presentation to ED pt is asymptomatic, pt denies fevers, chills, abdominal pain, flank pain, painful urination, drainage from nephrostomy tube. Pt presents in NAD speaking full sentences ambulatory through triage. Pt with L nephrostomy tube in place, dressing clean are dry.

## 2017-04-22 LAB
CULTURE RESULTS: SIGNIFICANT CHANGE UP
SPECIMEN SOURCE: SIGNIFICANT CHANGE UP

## 2017-05-05 ENCOUNTER — APPOINTMENT (OUTPATIENT)
Dept: UROLOGY | Facility: CLINIC | Age: 77
End: 2017-05-05

## 2017-05-05 VITALS
HEIGHT: 68 IN | SYSTOLIC BLOOD PRESSURE: 158 MMHG | DIASTOLIC BLOOD PRESSURE: 86 MMHG | WEIGHT: 160 LBS | BODY MASS INDEX: 24.25 KG/M2 | HEART RATE: 71 BPM | TEMPERATURE: 98.6 F

## 2017-05-05 DIAGNOSIS — Z86.79 PERSONAL HISTORY OF OTHER DISEASES OF THE CIRCULATORY SYSTEM: ICD-10-CM

## 2017-05-08 PROBLEM — Z86.79 HISTORY OF HYPERTENSION: Status: RESOLVED | Noted: 2017-05-08 | Resolved: 2017-05-08

## 2017-05-08 LAB
ANION GAP SERPL CALC-SCNC: 16 MMOL/L
APPEARANCE: CLEAR
APTT BLD: 31.5 SEC
BACTERIA UR CULT: NORMAL
BACTERIA: NEGATIVE
BASOPHILS # BLD AUTO: 0.03 K/UL
BASOPHILS NFR BLD AUTO: 0.6 %
BILIRUBIN URINE: NEGATIVE
BLOOD URINE: ABNORMAL
BUN SERPL-MCNC: 12 MG/DL
CALCIUM SERPL-MCNC: 9.9 MG/DL
CHLORIDE SERPL-SCNC: 105 MMOL/L
CO2 SERPL-SCNC: 23 MMOL/L
COLOR: YELLOW
CREAT SERPL-MCNC: 1.01 MG/DL
EOSINOPHIL # BLD AUTO: 0.06 K/UL
EOSINOPHIL NFR BLD AUTO: 1.1 %
GLUCOSE QUALITATIVE U: NORMAL MG/DL
GLUCOSE SERPL-MCNC: 84 MG/DL
HCT VFR BLD CALC: 45.2 %
HGB BLD-MCNC: 14.9 G/DL
HYALINE CASTS: 0 /LPF
IMM GRANULOCYTES NFR BLD AUTO: 0.2 %
INR PPP: 1.03 RATIO
KETONES URINE: NEGATIVE
LEUKOCYTE ESTERASE URINE: NEGATIVE
LYMPHOCYTES # BLD AUTO: 1.38 K/UL
LYMPHOCYTES NFR BLD AUTO: 25.9 %
MAN DIFF?: NORMAL
MCHC RBC-ENTMCNC: 29.6 PG
MCHC RBC-ENTMCNC: 33 GM/DL
MCV RBC AUTO: 89.7 FL
MICROSCOPIC-UA: NORMAL
MONOCYTES # BLD AUTO: 0.53 K/UL
MONOCYTES NFR BLD AUTO: 10 %
NEUTROPHILS # BLD AUTO: 3.31 K/UL
NEUTROPHILS NFR BLD AUTO: 62.2 %
NITRITE URINE: NEGATIVE
PH URINE: 6.5
PLATELET # BLD AUTO: 122 K/UL
POTASSIUM SERPL-SCNC: 4.8 MMOL/L
PROTEIN URINE: NEGATIVE MG/DL
PT BLD: 11.6 SEC
RBC # BLD: 5.04 M/UL
RBC # FLD: 13.7 %
RED BLOOD CELLS URINE: 2 /HPF
SODIUM SERPL-SCNC: 144 MMOL/L
SPECIFIC GRAVITY URINE: 1.01
SQUAMOUS EPITHELIAL CELLS: 1 /HPF
UROBILINOGEN URINE: NORMAL MG/DL
WBC # FLD AUTO: 5.32 K/UL
WHITE BLOOD CELLS URINE: 1 /HPF

## 2017-05-15 ENCOUNTER — OUTPATIENT (OUTPATIENT)
Dept: OUTPATIENT SERVICES | Facility: HOSPITAL | Age: 77
LOS: 1 days | End: 2017-05-15
Payer: MEDICARE

## 2017-05-15 DIAGNOSIS — N20.1 CALCULUS OF URETER: ICD-10-CM

## 2017-05-15 PROCEDURE — 93010 ELECTROCARDIOGRAM REPORT: CPT

## 2017-05-15 PROCEDURE — 93005 ELECTROCARDIOGRAM TRACING: CPT

## 2017-05-17 VITALS
DIASTOLIC BLOOD PRESSURE: 69 MMHG | HEART RATE: 70 BPM | TEMPERATURE: 97 F | RESPIRATION RATE: 16 BRPM | WEIGHT: 165.79 LBS | SYSTOLIC BLOOD PRESSURE: 129 MMHG | OXYGEN SATURATION: 99 % | HEIGHT: 69 IN

## 2017-05-18 ENCOUNTER — OUTPATIENT (OUTPATIENT)
Dept: OUTPATIENT SERVICES | Facility: HOSPITAL | Age: 77
LOS: 1 days | Discharge: HOME | End: 2017-05-18
Payer: MEDICARE

## 2017-05-18 VITALS
HEART RATE: 56 BPM | DIASTOLIC BLOOD PRESSURE: 64 MMHG | RESPIRATION RATE: 18 BRPM | OXYGEN SATURATION: 100 % | TEMPERATURE: 97 F | SYSTOLIC BLOOD PRESSURE: 135 MMHG

## 2017-05-18 PROCEDURE — 82365 CALCULUS SPECTROSCOPY: CPT

## 2017-05-18 PROCEDURE — C1889: CPT

## 2017-05-18 PROCEDURE — 76000 FLUOROSCOPY <1 HR PHYS/QHP: CPT

## 2017-05-18 PROCEDURE — 52356 CYSTO/URETERO W/LITHOTRIPSY: CPT | Mod: LT

## 2017-05-18 PROCEDURE — C2617: CPT

## 2017-05-18 PROCEDURE — C1769: CPT

## 2017-05-18 RX ORDER — ONDANSETRON 8 MG/1
4 TABLET, FILM COATED ORAL EVERY 6 HOURS
Qty: 0 | Refills: 0 | Status: DISCONTINUED | OUTPATIENT
Start: 2017-05-18 | End: 2017-05-18

## 2017-05-18 RX ORDER — DOCUSATE SODIUM 100 MG
1 CAPSULE ORAL
Qty: 14 | Refills: 0
Start: 2017-05-18 | End: 2017-05-25

## 2017-05-18 RX ORDER — SODIUM CHLORIDE 9 MG/ML
1000 INJECTION, SOLUTION INTRAVENOUS
Qty: 0 | Refills: 0 | Status: DISCONTINUED | OUTPATIENT
Start: 2017-05-18 | End: 2017-05-18

## 2017-05-18 RX ORDER — MORPHINE SULFATE 50 MG/1
4 CAPSULE, EXTENDED RELEASE ORAL EVERY 4 HOURS
Qty: 0 | Refills: 0 | Status: DISCONTINUED | OUTPATIENT
Start: 2017-05-18 | End: 2017-05-18

## 2017-05-18 RX ORDER — OXYBUTYNIN CHLORIDE 5 MG
1 TABLET ORAL
Qty: 30 | Refills: 0
Start: 2017-05-18 | End: 2017-06-17

## 2017-05-18 NOTE — BRIEF OPERATIVE NOTE - PROCEDURE
Ureteroscopic laser fragmentation and extraction of calculus of left ureter  05/18/2017    Active  ADELE  Cystoscopic insertion of left ureteral stent  05/18/2017    Active  ADELE

## 2017-05-23 DIAGNOSIS — N20.2 CALCULUS OF KIDNEY WITH CALCULUS OF URETER: ICD-10-CM

## 2017-05-23 DIAGNOSIS — I10 ESSENTIAL (PRIMARY) HYPERTENSION: ICD-10-CM

## 2017-05-23 DIAGNOSIS — Z79.82 LONG TERM (CURRENT) USE OF ASPIRIN: ICD-10-CM

## 2017-05-23 LAB — COMPN STONE: SIGNIFICANT CHANGE UP

## 2017-05-26 ENCOUNTER — APPOINTMENT (OUTPATIENT)
Dept: UROLOGY | Facility: CLINIC | Age: 77
End: 2017-05-26

## 2017-05-26 VITALS
DIASTOLIC BLOOD PRESSURE: 74 MMHG | TEMPERATURE: 97.5 F | SYSTOLIC BLOOD PRESSURE: 119 MMHG | WEIGHT: 160 LBS | BODY MASS INDEX: 24.25 KG/M2 | HEART RATE: 75 BPM | HEIGHT: 68 IN

## 2017-06-13 ENCOUNTER — FORM ENCOUNTER (OUTPATIENT)
Age: 77
End: 2017-06-13

## 2017-06-14 ENCOUNTER — OUTPATIENT (OUTPATIENT)
Dept: OUTPATIENT SERVICES | Facility: HOSPITAL | Age: 77
LOS: 1 days | End: 2017-06-14
Payer: MEDICARE

## 2017-06-14 PROCEDURE — 76770 US EXAM ABDO BACK WALL COMP: CPT

## 2017-06-14 PROCEDURE — 76770 US EXAM ABDO BACK WALL COMP: CPT | Mod: 26

## 2017-07-21 ENCOUNTER — APPOINTMENT (OUTPATIENT)
Dept: UROLOGY | Facility: CLINIC | Age: 77
End: 2017-07-21

## 2017-07-21 VITALS
HEART RATE: 64 BPM | BODY MASS INDEX: 24.25 KG/M2 | SYSTOLIC BLOOD PRESSURE: 133 MMHG | WEIGHT: 160 LBS | DIASTOLIC BLOOD PRESSURE: 77 MMHG | TEMPERATURE: 97.6 F | HEIGHT: 68 IN

## 2017-07-21 RX ORDER — AMLODIPINE BESYLATE 2.5 MG/1
2.5 TABLET ORAL
Qty: 30 | Refills: 0 | Status: ACTIVE | COMMUNITY
Start: 2016-08-26

## 2017-08-23 NOTE — ED PROVIDER NOTE - NSCAREINITIATED _GEN_ER
Pt presents with left thumb swelling and infection from [facility]. Pt was told to go to [facility] to see Dr. Bellamy to have surgery on thumb. Pt's exhusband refusing to drive pt to [facility] and drove pt here to see Dr. Bellamy.   
Rashel Mccarthy(Attending)

## 2018-10-15 ENCOUNTER — APPOINTMENT (OUTPATIENT)
Dept: OTOLARYNGOLOGY | Facility: CLINIC | Age: 78
End: 2018-10-15
Payer: MEDICARE

## 2018-10-15 VITALS
DIASTOLIC BLOOD PRESSURE: 82 MMHG | SYSTOLIC BLOOD PRESSURE: 130 MMHG | TEMPERATURE: 97.9 F | OXYGEN SATURATION: 100 % | HEART RATE: 58 BPM

## 2018-10-15 DIAGNOSIS — H61.22 IMPACTED CERUMEN, LEFT EAR: ICD-10-CM

## 2018-10-15 PROCEDURE — 99202 OFFICE O/P NEW SF 15 MIN: CPT

## 2019-05-31 NOTE — PATIENT PROFILE ADULT. - PRO ANTICIPATED DISCH DISP
home

## 2019-11-04 ENCOUNTER — EMERGENCY (EMERGENCY)
Facility: HOSPITAL | Age: 79
LOS: 1 days | Discharge: HOME | End: 2019-11-04
Attending: EMERGENCY MEDICINE | Admitting: EMERGENCY MEDICINE
Payer: MEDICARE

## 2019-11-04 VITALS
TEMPERATURE: 98 F | HEART RATE: 57 BPM | RESPIRATION RATE: 18 BRPM | DIASTOLIC BLOOD PRESSURE: 74 MMHG | SYSTOLIC BLOOD PRESSURE: 124 MMHG | OXYGEN SATURATION: 99 %

## 2019-11-04 VITALS
SYSTOLIC BLOOD PRESSURE: 157 MMHG | WEIGHT: 167.55 LBS | TEMPERATURE: 98 F | HEART RATE: 71 BPM | HEIGHT: 69 IN | RESPIRATION RATE: 17 BRPM | OXYGEN SATURATION: 99 % | DIASTOLIC BLOOD PRESSURE: 83 MMHG

## 2019-11-04 DIAGNOSIS — M79.662 PAIN IN LEFT LOWER LEG: ICD-10-CM

## 2019-11-04 DIAGNOSIS — I82.402 ACUTE EMBOLISM AND THROMBOSIS OF UNSPECIFIED DEEP VEINS OF LEFT LOWER EXTREMITY: ICD-10-CM

## 2019-11-04 LAB
ALBUMIN SERPL ELPH-MCNC: 4 G/DL — SIGNIFICANT CHANGE UP (ref 3.3–5)
ALP SERPL-CCNC: 77 U/L — SIGNIFICANT CHANGE UP (ref 40–120)
ALT FLD-CCNC: 14 U/L — SIGNIFICANT CHANGE UP (ref 10–45)
ANION GAP SERPL CALC-SCNC: 9 MMOL/L — SIGNIFICANT CHANGE UP (ref 5–17)
APTT BLD: 31.6 SEC — SIGNIFICANT CHANGE UP (ref 27.5–36.3)
AST SERPL-CCNC: 15 U/L — SIGNIFICANT CHANGE UP (ref 10–40)
BASOPHILS # BLD AUTO: 0.05 K/UL — SIGNIFICANT CHANGE UP (ref 0–0.2)
BASOPHILS NFR BLD AUTO: 0.7 % — SIGNIFICANT CHANGE UP (ref 0–2)
BILIRUB SERPL-MCNC: 0.3 MG/DL — SIGNIFICANT CHANGE UP (ref 0.2–1.2)
BUN SERPL-MCNC: 10 MG/DL — SIGNIFICANT CHANGE UP (ref 7–23)
CALCIUM SERPL-MCNC: 9.4 MG/DL — SIGNIFICANT CHANGE UP (ref 8.4–10.5)
CHLORIDE SERPL-SCNC: 108 MMOL/L — SIGNIFICANT CHANGE UP (ref 96–108)
CO2 SERPL-SCNC: 24 MMOL/L — SIGNIFICANT CHANGE UP (ref 22–31)
CREAT SERPL-MCNC: 0.92 MG/DL — SIGNIFICANT CHANGE UP (ref 0.5–1.3)
EOSINOPHIL # BLD AUTO: 0.02 K/UL — SIGNIFICANT CHANGE UP (ref 0–0.5)
EOSINOPHIL NFR BLD AUTO: 0.3 % — SIGNIFICANT CHANGE UP (ref 0–6)
GLUCOSE SERPL-MCNC: 107 MG/DL — HIGH (ref 70–99)
HCT VFR BLD CALC: 47.6 % — HIGH (ref 34.5–45)
HGB BLD-MCNC: 15.1 G/DL — SIGNIFICANT CHANGE UP (ref 11.5–15.5)
IMM GRANULOCYTES NFR BLD AUTO: 0.3 % — SIGNIFICANT CHANGE UP (ref 0–1.5)
INR BLD: 1.11 — SIGNIFICANT CHANGE UP (ref 0.88–1.16)
LYMPHOCYTES # BLD AUTO: 1.21 K/UL — SIGNIFICANT CHANGE UP (ref 1–3.3)
LYMPHOCYTES # BLD AUTO: 16.8 % — SIGNIFICANT CHANGE UP (ref 13–44)
MCHC RBC-ENTMCNC: 29.7 PG — SIGNIFICANT CHANGE UP (ref 27–34)
MCHC RBC-ENTMCNC: 31.7 GM/DL — LOW (ref 32–36)
MCV RBC AUTO: 93.5 FL — SIGNIFICANT CHANGE UP (ref 80–100)
MONOCYTES # BLD AUTO: 0.46 K/UL — SIGNIFICANT CHANGE UP (ref 0–0.9)
MONOCYTES NFR BLD AUTO: 6.4 % — SIGNIFICANT CHANGE UP (ref 2–14)
NEUTROPHILS # BLD AUTO: 5.46 K/UL — SIGNIFICANT CHANGE UP (ref 1.8–7.4)
NEUTROPHILS NFR BLD AUTO: 75.5 % — SIGNIFICANT CHANGE UP (ref 43–77)
NRBC # BLD: 0 /100 WBCS — SIGNIFICANT CHANGE UP (ref 0–0)
PLATELET # BLD AUTO: 141 K/UL — LOW (ref 150–400)
POTASSIUM SERPL-MCNC: 4.3 MMOL/L — SIGNIFICANT CHANGE UP (ref 3.5–5.3)
POTASSIUM SERPL-SCNC: 4.3 MMOL/L — SIGNIFICANT CHANGE UP (ref 3.5–5.3)
PROT SERPL-MCNC: 6.7 G/DL — SIGNIFICANT CHANGE UP (ref 6–8.3)
PROTHROM AB SERPL-ACNC: 12.6 SEC — SIGNIFICANT CHANGE UP (ref 10–12.9)
RBC # BLD: 5.09 M/UL — SIGNIFICANT CHANGE UP (ref 3.8–5.2)
RBC # FLD: 13 % — SIGNIFICANT CHANGE UP (ref 10.3–14.5)
SODIUM SERPL-SCNC: 141 MMOL/L — SIGNIFICANT CHANGE UP (ref 135–145)
WBC # BLD: 7.22 K/UL — SIGNIFICANT CHANGE UP (ref 3.8–10.5)
WBC # FLD AUTO: 7.22 K/UL — SIGNIFICANT CHANGE UP (ref 3.8–10.5)

## 2019-11-04 PROCEDURE — 93971 EXTREMITY STUDY: CPT | Mod: 26,LT

## 2019-11-04 PROCEDURE — 80053 COMPREHEN METABOLIC PANEL: CPT

## 2019-11-04 PROCEDURE — 85730 THROMBOPLASTIN TIME PARTIAL: CPT

## 2019-11-04 PROCEDURE — 85025 COMPLETE CBC W/AUTO DIFF WBC: CPT

## 2019-11-04 PROCEDURE — 93971 EXTREMITY STUDY: CPT

## 2019-11-04 PROCEDURE — 99284 EMERGENCY DEPT VISIT MOD MDM: CPT | Mod: 25

## 2019-11-04 PROCEDURE — 99284 EMERGENCY DEPT VISIT MOD MDM: CPT

## 2019-11-04 PROCEDURE — 85610 PROTHROMBIN TIME: CPT

## 2019-11-04 PROCEDURE — 36415 COLL VENOUS BLD VENIPUNCTURE: CPT

## 2019-11-04 RX ORDER — FONDAPARINUX SODIUM 2.5 MG/.5ML
1 INJECTION, SOLUTION SUBCUTANEOUS
Qty: 42 | Refills: 0
Start: 2019-11-04 | End: 2019-11-24

## 2019-11-04 NOTE — ED ADULT NURSE NOTE - NS ED NOTE  TALK SOMEONE YN
Nutrition Services Progress Note    Physician: Dr. Jones/Dr. Patel  Diagnosis: bladder cancer  Treatment:  Cisplatin + RT    Food and Nutrition Related History:  Met with pt in RT today  He is eating well   He is drinking ~2L fluids, he reports  He states he had diarrhea yesterday, but this was after eating a greasy restaurant breakfast, and resolved with a few doses of immodium    Anthropometric Measurements:  Estimated body mass index is 33.05 kg/m² as calculated from the following:    Height as of 7/30/18: 6' 2.49\" (1.892 m).    Weight as of 7/30/18: 118.3 kg.    Wt Readings from Last 5 Encounters:   07/30/18 118.3 kg   07/23/18 118.2 kg   07/16/18 119.9 kg   07/09/18 120.7 kg   07/02/18 117.8 kg     Usual Weight:  115-117kg  Weight Change:  stable    Biochemical Data, Medical Tests, and Procedures:  -    Nutrition-Focused Physical Findings:  Overall appearance: no s/s malnutrition    Comparative Standards:  Estimated energy needs -  Total energy estimated needs (CS-1.1.1): 2500kcal, 115-140g pro    Nutrition Diagnosis:  Food-and-nutrition-related knowledge deficit  related to eating during treatment as evidenced by consult.      Intervention:  Recommended modifications:       High protein diet - lower fiber/low fat as needed  At least 3L fluids/d    Monitoring and Evaluation:  Weight: <5% loss during treatment -stable  Knowledge level: general   No

## 2019-11-04 NOTE — ED PROVIDER NOTE - OBJECTIVE STATEMENT
80 y/o independently living F with a PMHx of HTN on baby aspirin and amlodipine presents to the ED with c/o leg pain. Around 6 weeks ago she reports falling on her left leg when she tried to climb up onto a chair. She had a hematoma extending from her left knee to left ankle. Pt went to an urgent care center and had imaging and US performed on her leg which did not show DVT. Since then, the hematoma has vastly improved and she has not had pain from it. Yesterday however, she suddenly had a fleeting, tingling, sharp pain in the area of the remaining hematoma. In the ED she denies any pain in the area now, but she has concern for a possible blood clot which is why she visited the ED today. Pt denies current pain, calf tenderness, CP, or SOB. 78 y/o independently living F with a PMHx of "dry eyes" HTN on baby aspirin and amlodipine presents to the ED with c/o leg pain. Around 6 weeks ago she reports falling on her left leg when she tried to climb up onto a chair. She had a hematoma extending from her left knee to left ankle. Pt went to an urgent care center and had imaging and US performed on her leg which did not show DVT. Since then, the hematoma has vastly improved and she has not had pain from it. Yesterday however, she suddenly had a fleeting, tingling, sharp pain in the area of the remaining hematoma. In the ED she denies any pain in the area now, but she has concern for a possible blood clot which is why she visited the ED today. Pt denies current pain, calf tenderness, CP, or SOB. 80 y/o independently living F with a PMHx of "dry eyes", HTN on baby aspirin and amlodipine presents to the ED with c/o leg pain. Around 6 weeks ago she reports falling on her left leg when she tried to climb up onto a chair. She had a hematoma extending from her left knee to left ankle. Pt went to an urgent care center and had imaging and US performed on her leg which did not show DVT. Since then, the hematoma has vastly improved and she has not had pain from it. Yesterday however, she suddenly had a fleeting, tingling, sharp pain in the area of the remaining hematoma. In the ED she denies any pain in the area now, but she has concern for a possible blood clot which is why she visited the ED today. Pt denies current pain, calf tenderness, CP, or SOB.

## 2019-11-04 NOTE — ED PROVIDER NOTE - PATIENT PORTAL LINK FT
You can access the FollowMyHealth Patient Portal offered by [facility] by registering at the following website: [email or web address]. By joining [employer]’s FollowMyHealth portal, you will also be able to view your health information using other applications (apps) compatible with our system.

## 2019-11-04 NOTE — ED PROVIDER NOTE - NSFOLLOWUPCLINICS_GEN_ALL_ED_FT
[facility]  Family Medicine  [address]  [place], [place] [address]  Phone: [phone]  Fax:   Follow Up Time: 4-6 Days

## 2019-11-04 NOTE — ED PROVIDER NOTE - NSFOLLOWUPINSTRUCTIONS_ED_ALL_ED_FT
Please follow up with your primary care doctor in 3-5 days.     Deep Vein Thrombosis    A deep vein thrombosis (DVT) is a blood clot (thrombus) that usually occurs in a deep, larger vein of the lower leg or the pelvis, or in an upper extremity such as the arm. These are dangerous and can lead to serious and even life-threatening complications if the clot travels to the lungs. Symptoms include swelling of the arm or leg, warmth and redness of the arm or leg, and pain. Treatment may include taking a blood thinning medication; make sure to take anything prescribed as instructed.    SEEK IMMEDIATE MEDICAL CARE IF YOU HAVE ANY OF THE FOLLOWING SYMPTOMS: shortness of breath, chest pain, rapid or irregular heartbeat, lightheadedness/dizziness, coughing up blood, or any bleeding in your vomit, stool, or urine. These symptoms may represent a serious problem that is an emergency. Do not wait to see if the symptoms will go away. Call 911 and do not drive yourself to the hospital.

## 2019-11-04 NOTE — ED PROVIDER NOTE - CLINICAL SUMMARY MEDICAL DECISION MAKING FREE TEXT BOX
78 y/o F with a PMHx of HTN and a LLE hematoma from an injury 6 weeks ago presents to the ED with c/o sharp tingling fleeting pain in area of hematoma. Will plan to do US and basic blood work. Depending on results, will dispo pt. 80 y/o F with a PMHx of HTN and a LLE hematoma from an injury 6 weeks ago presents to the ED with c/o sharp tingling fleeting pain in area of hematoma. Labs noted. DVT study done and with deep venous thrombosis noted in posterior tibial veins only. Pt started on Xarelto. Rx given. To f/up outpt with PCP in a week for further evaluations and medications.

## 2019-11-04 NOTE — ED ADULT NURSE NOTE - OBJECTIVE STATEMENT
80 y/o female received into the ed, axox3, stating that she had an injury to the left shin in Sept/2019.  Pt. was noted to have large lump to the lower portion of the left shin.  No redness/swelling/warmth noted to the area.  Pt. also denies any chest pain or SOB.  Pt. is able to ambulate and bear weight with no pain.  Pt. states that she came in because she felt a "tingling and pulling" sensation to the area.

## 2019-11-04 NOTE — ED ADULT NURSE NOTE - PMH
Oriented - self; Oriented - place; Oriented - time
Essential hypertension  HTN (hypertension)  Gastroesophageal reflux disease  GERD (gastroesophageal reflux disease)  History of nutritional disorder  H/O vitamin D deficiency

## 2019-11-04 NOTE — ED PROVIDER NOTE - PHYSICAL EXAMINATION
VITAL SIGNS: I have reviewed nursing notes and confirm.  CONSTITUTIONAL: Well-developed; well-nourished; in no acute distress.   SKIN:  warm and dry, no acute rash.   HEAD:  normocephalic, atraumatic.  EYES: EOM intact; conjunctiva and sclera clear.  ENT: No nasal discharge; airway clear.   NECK: Supple; non tender.  CARD: S1, S2 normal; Regular rate and rhythm.   RESP:  Clear to auscultation b/l, no wheezes, rales or rhonchi.  ABD: Normal bowel sounds; soft; non-distended; non-tender; no guarding/ rebound.  LLE: Hematoma with bruising noted to anterior shin of left leg with no tenderness to palpation. No pain over area. No posterior tenderness. No thigh tenderness.  NEURO: Alert, oriented, grossly unremarkable  PSYCH: Cooperative, mood and affect appropriate. VITAL SIGNS: I have reviewed nursing notes and confirm.  CONSTITUTIONAL: Well-developed; well-nourished; in no acute distress.   SKIN:  warm and dry, no acute rash.   HEAD:  normocephalic, atraumatic.  EYES: EOM intact; conjunctiva and sclera clear.  ENT: No nasal discharge; airway clear.   NECK: Supple; non tender.  CARD: S1, S2 normal; Regular rate and rhythm.   RESP:  Clear to auscultation b/l, no wheezes, rales or rhonchi.  ABD: Normal bowel sounds; soft; non-distended; non-tender; no guarding/ rebound.  LLE: Hematoma with bruising noted to anterior shin of left leg with no tenderness to palpation. No pain over area. No posterior calf tenderness. No thigh tenderness.  NEURO: Alert, oriented, grossly unremarkable  PSYCH: Cooperative, mood and affect appropriate.

## 2019-12-03 ENCOUNTER — APPOINTMENT (OUTPATIENT)
Dept: SURGICAL ONCOLOGY | Facility: CLINIC | Age: 79
End: 2019-12-03
Payer: MEDICARE

## 2019-12-03 VITALS
TEMPERATURE: 98.2 F | WEIGHT: 145 LBS | HEART RATE: 60 BPM | SYSTOLIC BLOOD PRESSURE: 149 MMHG | HEIGHT: 68 IN | BODY MASS INDEX: 21.98 KG/M2 | DIASTOLIC BLOOD PRESSURE: 85 MMHG

## 2019-12-03 DIAGNOSIS — Z86.718 PERSONAL HISTORY OF OTHER VENOUS THROMBOSIS AND EMBOLISM: ICD-10-CM

## 2019-12-03 DIAGNOSIS — Z87.442 PERSONAL HISTORY OF URINARY CALCULI: ICD-10-CM

## 2019-12-03 PROCEDURE — 99203 OFFICE O/P NEW LOW 30 MIN: CPT

## 2019-12-03 RX ORDER — CIPROFLOXACIN HYDROCHLORIDE 500 MG/1
500 TABLET, FILM COATED ORAL
Qty: 14 | Refills: 0 | Status: DISCONTINUED | COMMUNITY
Start: 2017-04-06 | End: 2019-12-03

## 2019-12-03 RX ORDER — LORATADINE 10 MG/1
10 TABLET ORAL
Qty: 30 | Refills: 0 | Status: DISCONTINUED | COMMUNITY
Start: 2016-08-26 | End: 2019-12-03

## 2019-12-03 RX ORDER — RANITIDINE 150 MG/1
150 TABLET ORAL
Qty: 60 | Refills: 0 | Status: DISCONTINUED | COMMUNITY
Start: 2017-05-30 | End: 2019-12-03

## 2019-12-03 RX ORDER — AMOXICILLIN AND CLAVULANATE POTASSIUM 875; 125 MG/1; MG/1
875-125 TABLET, COATED ORAL
Qty: 20 | Refills: 0 | Status: DISCONTINUED | COMMUNITY
Start: 2017-03-10 | End: 2019-12-03

## 2019-12-03 RX ORDER — OXYBUTYNIN CHLORIDE 5 MG/1
5 TABLET, EXTENDED RELEASE ORAL
Qty: 30 | Refills: 0 | Status: DISCONTINUED | COMMUNITY
Start: 2017-05-18 | End: 2019-12-03

## 2019-12-03 RX ORDER — CEFPODOXIME PROXETIL 200 MG/1
200 TABLET, FILM COATED ORAL
Qty: 18 | Refills: 0 | Status: DISCONTINUED | COMMUNITY
Start: 2017-03-03 | End: 2019-12-03

## 2019-12-03 RX ORDER — SULFAMETHOXAZOLE AND TRIMETHOPRIM 800; 160 MG/1; MG/1
800-160 TABLET ORAL
Qty: 28 | Refills: 0 | Status: DISCONTINUED | COMMUNITY
Start: 2017-02-07 | End: 2019-12-03

## 2019-12-03 RX ORDER — FLUCONAZOLE 150 MG/1
150 TABLET ORAL
Qty: 1 | Refills: 0 | Status: DISCONTINUED | COMMUNITY
Start: 2017-02-15 | End: 2019-12-03

## 2019-12-03 RX ORDER — OXYCODONE AND ACETAMINOPHEN 5; 325 MG/1; MG/1
5-325 TABLET ORAL
Qty: 15 | Refills: 0 | Status: DISCONTINUED | COMMUNITY
Start: 2017-05-18 | End: 2019-12-03

## 2019-12-03 RX ORDER — CEPHALEXIN 500 MG/1
500 CAPSULE ORAL
Qty: 30 | Refills: 0 | Status: DISCONTINUED | COMMUNITY
Start: 2017-04-30 | End: 2019-12-03

## 2019-12-03 NOTE — HISTORY OF PRESENT ILLNESS
[de-identified] : Patient Name: LIV HALE \par MRN: [id number] \par [place] MRN:\par Referring Provider: Dr. Rodriguez\par Oncologist: n/a\par Date: Dec 03, 2019 \par \par Diagnosis: pancreas cyst\par \par 78 yo  presents for evaluation of a pancreas cyst. She was noted to have a pancreas cyst which was found incidentally on imaging when she was hospitalized around the year 2015. She was being monitored with annual MRI's and cyst was reportedly stable. 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 She denies any history of having an upper endoscopy or EUS. Her PCP, Dr. Tee has referred patient here for consultation.\par She denies a known history of pancreatitis, or symptoms of jaundice.\par \par Of note, she was recently diagnosed with a DVT and is on xarelto until February 2020.\par Curently, Ms. HALE denies abdominal pain and discomfort, denies having decreased appetite, and denies nausea or vomiting. She denies changes in bowel habits and denies recent unintentional weight loss. She denies fevers, chills, or night sweats.\par \par Functional Status: Ms. HALE is able to exercise without fatigue or dyspnea.\par

## 2019-12-03 NOTE — PHYSICAL EXAM
[Normal] : supple, no neck mass and thyroid not enlarged [Normal] : oriented to person, place and time, with appropriate affect [de-identified] : S1,S2, regular rate and rhythm. No murmurs heard. [de-identified] : Clear throughout. No wheezes heard. [de-identified] : warm and dry

## 2019-12-03 NOTE — RESULTS/DATA
[FreeTextEntry1] : Diagnostic Studies\par \par Date: 11/30/19\par Study: MRI Abdomen WWO ([facility])\par Results: Thinly septated multilocular cystic lesion in the pancreatic tail which demonstrates a direct connection to the main pancreatic duct measures up to 2.2cm maximal diameter, previously 2.3cm when remeasured similarly. In 2015 this measured up to 1.8cm. The walls and septations are thin and there is no mural nodule. Cystic lesion with thin septations in the pancreatic body measures up to 1.2cm stable dating back to 2015, at that time measuring 1.2cm. There are no worrisome features or high risk stigmata. 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 No worrisome features or high risk stigmata.\par \par \par Date:\par Study:\par Results:\par \par \par Date:\par Study:\par Results:\par \par \par Date: 1\par Study:\par Results:\par \par Labs:\par \par Date:\par Results:\par \par Pathology:\par \par Date:\par Results:\par \par

## 2019-12-03 NOTE — ASSESSMENT
[FreeTextEntry1] : I) Pancreas cyst\par \par P) Discussed with patient cyst has been stable over last 2 years and slightly increased in size since 2015. No concerning features. Recommend continued observation. Would re image next year and if stable then every 2 years till 2025. If grows will consider EUS. All questions answered.\par \par Lakhwinder Powell MD\par \par Chief Surgical Oncology\par Multidisciplinary GI cancer program\par [facility]\par [facility]\par \par Professor Surgery\par [employer]\par \par cc Dr Tee

## 2020-11-16 ENCOUNTER — APPOINTMENT (OUTPATIENT)
Dept: NEPHROLOGY | Facility: CLINIC | Age: 80
End: 2020-11-16
Payer: MEDICARE

## 2020-11-16 VITALS
DIASTOLIC BLOOD PRESSURE: 72 MMHG | HEART RATE: 72 BPM | SYSTOLIC BLOOD PRESSURE: 126 MMHG | BODY MASS INDEX: 21.98 KG/M2 | HEIGHT: 68 IN | WEIGHT: 145 LBS

## 2020-11-16 DIAGNOSIS — N20.1 CALCULUS OF URETER: ICD-10-CM

## 2020-11-16 PROCEDURE — 99072 ADDL SUPL MATRL&STAF TM PHE: CPT

## 2020-11-16 PROCEDURE — 99204 OFFICE O/P NEW MOD 45 MIN: CPT

## 2020-11-16 NOTE — CONSULT LETTER
[Dear  ___] : Dear  [unfilled], [Consult Letter:] : I had the pleasure of evaluating your patient, [unfilled]. [Please see my note below.] : Please see my note below. [Consult Closing:] : Thank you very much for allowing me to participate in the care of this patient.  If you have any questions, please do not hesitate to contact me. [Sincerely,] : Sincerely, [FreeTextEntry2] : Dr Tee [FreeTextEntry3] : Sincerely, \par \par Gokul Paulino MD, FACP

## 2020-11-16 NOTE — PHYSICAL EXAM
[General Appearance - Alert] : alert [General Appearance - In No Acute Distress] : in no acute distress [Sclera] : the sclera and conjunctiva were normal [PERRL With Normal Accommodation] : pupils were equal in size, round, and reactive to light [Outer Ear] : the ears and nose were normal in appearance [Neck Appearance] : the appearance of the neck was normal [Neck Cervical Mass (___cm)] : no neck mass was observed [Jugular Venous Distention Increased] : there was no jugular-venous distention [Auscultation Breath Sounds / Voice Sounds] : lungs were clear to auscultation bilaterally [Heart Rate And Rhythm] : heart rate was normal and rhythm regular [Heart Sounds] : normal S1 and S2 [Heart Sounds Gallop] : no gallops [Murmurs] : no murmurs [Heart Sounds Pericardial Friction Rub] : no pericardial rub [Edema] : there was no peripheral edema [Cervical Lymph Nodes Enlarged Posterior Bilaterally] : posterior cervical [Cervical Lymph Nodes Enlarged Anterior Bilaterally] : anterior cervical [Supraclavicular Lymph Nodes Enlarged Bilaterally] : supraclavicular [No CVA Tenderness] : no ~M costovertebral angle tenderness [Skin Color & Pigmentation] : normal skin color and pigmentation [Skin Turgor] : normal skin turgor [] : no rash [Deep Tendon Reflexes (DTR)] : deep tendon reflexes were 2+ and symmetric [No Focal Deficits] : no focal deficits [Oriented To Time, Place, And Person] : oriented to person, place, and time [Impaired Insight] : insight and judgment were intact [Affect] : the affect was normal

## 2020-11-16 NOTE — HISTORY OF PRESENT ILLNESS
[FreeTextEntry1] : 80-year-old woman with a history of hypertension, well controlled on amlodipine 2.5 mg daily.  She has a history of a stone removed by basket extraction in 2017, which was 90% calcium oxalate and 10% calcium phosphate.  There has been no recurrence.  Recent blood work by Dr. eTe showed a creatinine of 0.96 with a GFR of 55-64.  She is scheduled for repeat labs very shortly.  There is no current history of hematuria or flank pain.  She does not use NSAIDs or PPIs.  She has not undergone any renal imaging in 3 years.

## 2020-11-16 NOTE — ASSESSMENT
[FreeTextEntry1] : 80-year-old woman with controlled hypertension, history of 1 kidney stone 3 years ago with no recurrence, and borderline kidney function judged by creatinine and GFR.  At age 80, I have reassured her that her kidneys are functioning quite well and that she has a large margin of safety.  I have not ordered any additional labs and will wait to see the results of the upcoming.  I did order an ultrasound to assess kidney size, echogenicity, and rule out obstructive uropathy by a silent stone.  Her renal prognosis should be excellent.

## 2020-12-06 ENCOUNTER — EMERGENCY (EMERGENCY)
Facility: HOSPITAL | Age: 80
LOS: 1 days | Discharge: HOME | End: 2020-12-06
Attending: EMERGENCY MEDICINE | Admitting: EMERGENCY MEDICINE
Payer: MEDICARE

## 2020-12-06 VITALS
HEART RATE: 68 BPM | SYSTOLIC BLOOD PRESSURE: 162 MMHG | TEMPERATURE: 98 F | WEIGHT: 160.06 LBS | RESPIRATION RATE: 18 BRPM | OXYGEN SATURATION: 100 % | DIASTOLIC BLOOD PRESSURE: 85 MMHG | HEIGHT: 69 IN

## 2020-12-06 VITALS
RESPIRATION RATE: 18 BRPM | DIASTOLIC BLOOD PRESSURE: 80 MMHG | OXYGEN SATURATION: 98 % | SYSTOLIC BLOOD PRESSURE: 165 MMHG | HEART RATE: 72 BPM

## 2020-12-06 DIAGNOSIS — I10 ESSENTIAL (PRIMARY) HYPERTENSION: ICD-10-CM

## 2020-12-06 DIAGNOSIS — M79.605 PAIN IN LEFT LEG: ICD-10-CM

## 2020-12-06 DIAGNOSIS — Z79.899 OTHER LONG TERM (CURRENT) DRUG THERAPY: ICD-10-CM

## 2020-12-06 DIAGNOSIS — Z88.8 ALLERGY STATUS TO OTHER DRUGS, MEDICAMENTS AND BIOLOGICAL SUBSTANCES: ICD-10-CM

## 2020-12-06 DIAGNOSIS — M79.89 OTHER SPECIFIED SOFT TISSUE DISORDERS: ICD-10-CM

## 2020-12-06 LAB
ALBUMIN SERPL ELPH-MCNC: 3.7 G/DL — SIGNIFICANT CHANGE UP (ref 3.3–5)
ALP SERPL-CCNC: 72 U/L — SIGNIFICANT CHANGE UP (ref 40–120)
ALT FLD-CCNC: 13 U/L — SIGNIFICANT CHANGE UP (ref 10–45)
ANION GAP SERPL CALC-SCNC: 10 MMOL/L — SIGNIFICANT CHANGE UP (ref 5–17)
APTT BLD: 32.5 SEC — SIGNIFICANT CHANGE UP (ref 27.5–35.5)
AST SERPL-CCNC: 15 U/L — SIGNIFICANT CHANGE UP (ref 10–40)
BASOPHILS # BLD AUTO: 0.04 K/UL — SIGNIFICANT CHANGE UP (ref 0–0.2)
BASOPHILS NFR BLD AUTO: 0.6 % — SIGNIFICANT CHANGE UP (ref 0–2)
BILIRUB SERPL-MCNC: 0.3 MG/DL — SIGNIFICANT CHANGE UP (ref 0.2–1.2)
BUN SERPL-MCNC: 13 MG/DL — SIGNIFICANT CHANGE UP (ref 7–23)
CALCIUM SERPL-MCNC: 9.4 MG/DL — SIGNIFICANT CHANGE UP (ref 8.4–10.5)
CHLORIDE SERPL-SCNC: 110 MMOL/L — HIGH (ref 96–108)
CK SERPL-CCNC: 118 U/L — SIGNIFICANT CHANGE UP (ref 25–170)
CO2 SERPL-SCNC: 22 MMOL/L — SIGNIFICANT CHANGE UP (ref 22–31)
CREAT SERPL-MCNC: 0.84 MG/DL — SIGNIFICANT CHANGE UP (ref 0.5–1.3)
CRP SERPL-MCNC: <0.03 MG/DL — SIGNIFICANT CHANGE UP (ref 0–0.4)
EOSINOPHIL # BLD AUTO: 0.05 K/UL — SIGNIFICANT CHANGE UP (ref 0–0.5)
EOSINOPHIL NFR BLD AUTO: 0.8 % — SIGNIFICANT CHANGE UP (ref 0–6)
ERYTHROCYTE [SEDIMENTATION RATE] IN BLOOD: 6 MM/HR — SIGNIFICANT CHANGE UP
GLUCOSE SERPL-MCNC: 107 MG/DL — HIGH (ref 70–99)
HCT VFR BLD CALC: 43.3 % — SIGNIFICANT CHANGE UP (ref 34.5–45)
HGB BLD-MCNC: 14.5 G/DL — SIGNIFICANT CHANGE UP (ref 11.5–15.5)
IMM GRANULOCYTES NFR BLD AUTO: 0.3 % — SIGNIFICANT CHANGE UP (ref 0–1.5)
INR BLD: 1.11 — SIGNIFICANT CHANGE UP (ref 0.88–1.16)
LYMPHOCYTES # BLD AUTO: 1.36 K/UL — SIGNIFICANT CHANGE UP (ref 1–3.3)
LYMPHOCYTES # BLD AUTO: 22 % — SIGNIFICANT CHANGE UP (ref 13–44)
MCHC RBC-ENTMCNC: 30.5 PG — SIGNIFICANT CHANGE UP (ref 27–34)
MCHC RBC-ENTMCNC: 33.5 GM/DL — SIGNIFICANT CHANGE UP (ref 32–36)
MCV RBC AUTO: 91 FL — SIGNIFICANT CHANGE UP (ref 80–100)
MONOCYTES # BLD AUTO: 0.55 K/UL — SIGNIFICANT CHANGE UP (ref 0–0.9)
MONOCYTES NFR BLD AUTO: 8.9 % — SIGNIFICANT CHANGE UP (ref 2–14)
NEUTROPHILS # BLD AUTO: 4.15 K/UL — SIGNIFICANT CHANGE UP (ref 1.8–7.4)
NEUTROPHILS NFR BLD AUTO: 67.4 % — SIGNIFICANT CHANGE UP (ref 43–77)
NRBC # BLD: 0 /100 WBCS — SIGNIFICANT CHANGE UP (ref 0–0)
PLATELET # BLD AUTO: 117 K/UL — LOW (ref 150–400)
POTASSIUM SERPL-MCNC: 4 MMOL/L — SIGNIFICANT CHANGE UP (ref 3.5–5.3)
POTASSIUM SERPL-SCNC: 4 MMOL/L — SIGNIFICANT CHANGE UP (ref 3.5–5.3)
PROT SERPL-MCNC: 6.4 G/DL — SIGNIFICANT CHANGE UP (ref 6–8.3)
PROTHROM AB SERPL-ACNC: 13.2 SEC — SIGNIFICANT CHANGE UP (ref 10.6–13.6)
RBC # BLD: 4.76 M/UL — SIGNIFICANT CHANGE UP (ref 3.8–5.2)
RBC # FLD: 13.2 % — SIGNIFICANT CHANGE UP (ref 10.3–14.5)
SODIUM SERPL-SCNC: 142 MMOL/L — SIGNIFICANT CHANGE UP (ref 135–145)
URATE SERPL-MCNC: 4.6 MG/DL — SIGNIFICANT CHANGE UP (ref 2.5–7)
WBC # BLD: 6.17 K/UL — SIGNIFICANT CHANGE UP (ref 3.8–10.5)
WBC # FLD AUTO: 6.17 K/UL — SIGNIFICANT CHANGE UP (ref 3.8–10.5)

## 2020-12-06 PROCEDURE — 85610 PROTHROMBIN TIME: CPT

## 2020-12-06 PROCEDURE — 85025 COMPLETE CBC W/AUTO DIFF WBC: CPT

## 2020-12-06 PROCEDURE — 93971 EXTREMITY STUDY: CPT

## 2020-12-06 PROCEDURE — 82550 ASSAY OF CK (CPK): CPT

## 2020-12-06 PROCEDURE — 99284 EMERGENCY DEPT VISIT MOD MDM: CPT

## 2020-12-06 PROCEDURE — 85730 THROMBOPLASTIN TIME PARTIAL: CPT

## 2020-12-06 PROCEDURE — 86140 C-REACTIVE PROTEIN: CPT

## 2020-12-06 PROCEDURE — 93971 EXTREMITY STUDY: CPT | Mod: 26,LT

## 2020-12-06 PROCEDURE — 80053 COMPREHEN METABOLIC PANEL: CPT

## 2020-12-06 PROCEDURE — 85652 RBC SED RATE AUTOMATED: CPT

## 2020-12-06 PROCEDURE — 99285 EMERGENCY DEPT VISIT HI MDM: CPT

## 2020-12-06 PROCEDURE — 84550 ASSAY OF BLOOD/URIC ACID: CPT

## 2020-12-06 PROCEDURE — 36415 COLL VENOUS BLD VENIPUNCTURE: CPT

## 2020-12-06 NOTE — ED PROVIDER NOTE - PATIENT PORTAL LINK FT
You can access the FollowMyHealth Patient Portal offered by [facility] by registering at the following website: [email or web address]. By joining [employer]’s FollowMyHealth portal, you will also be able to view your health information using other applications (apps) compatible with our system.

## 2020-12-06 NOTE — ED PROVIDER NOTE - PHYSICAL EXAMINATION
CONSTITUTIONAL: Well-developed; well-nourished; in no acute distress.  SKIN: Warm and dry, no acute rash.  HEAD: Normocephalic; atraumatic.  EYES: PERRL, EOM intact; conjunctiva and sclera clear.  ENT: No nasal discharge; airway clear.  NECK: Supple; non tender.  CARD: S1, S2 normal; no murmurs, gallops, or rubs. Regular rate and rhythm.  RESP: No wheezes, rales or rhonchi.  ABD: Normal bowel sounds; soft; non-distended; non-tender; no hepatosplenomegaly.  EXT: Normal ROM. No clubbing, cyanosis or edema. Mild ttp to the left anterior shin with mild increased pigmentation (old scar as per pt) located on shin.   LYMPH: No acute cervical adenopathy.  NEURO: Alert, oriented. Grossly unremarkable.  PSYCH: Cooperative, appropriate.

## 2020-12-06 NOTE — ED PROVIDER NOTE - NSFOLLOWUPINSTRUCTIONS_ED_ALL_ED_FT
You have been given a preliminary reading of your Ultrasound which indicates it is negative for DVT. If there are any changes, you will be contacted. Please follow-up with your PMD in one week to determine is repeat US is indicated. Return to the Emergency Department if you have any new or worsening symptoms, or if you have any concerns.

## 2020-12-06 NOTE — ED PROVIDER NOTE - NS ED ROS FT
General: no fever, chills, confusion  Cardiac: no chest pain, chest tightness, palpitations  Lungs: no sob, difficulty breathing  Abdomen: no abdominal pain, nausea, vomiting, diarrhea, constipation, nml BM  : no dysuria, urinary frequency/urgency  MSK: + left lower leg discomfort with mild swelling    All other systems negative except as per HPI

## 2020-12-06 NOTE — ED PROVIDER NOTE - OBJECTIVE STATEMENT
81 y/o female with a PMHx of HTN, DVT, GERD is present in the ER c/o atraumatic left leg pain x3 days. Pt states every now and then she has started to feel discomfort located on the anterior portion of her left leg associated with some swelling and tenderness when she applies pressure to the shin. She denies taking anything for the pain. She denies the following: fever, chills, numbness/tingling of the lower extremities, redness, discharge, itching, 81 y/o female with a PMHx of HTN, DVT, GERD is present in the ER c/o atraumatic left leg pain x3 days. Pt states every now and then she has started to feel discomfort located on the anterior portion of her left leg associated with some swelling and tenderness when she applies pressure to the shin. She denies taking anything for the pain. She denies the following: fever, chills, numbness/tingling of the lower extremities, redness, discharge, itching, injury/fall.

## 2020-12-06 NOTE — ED ADULT TRIAGE NOTE - CHIEF COMPLAINT QUOTE
Pt reports left lower leg pain that began 2 days ago. Pt w/ hx of DVT in that leg 1.5 years ago. Pt denies SOB, fever, chills, injury, redness, swelling.

## 2020-12-06 NOTE — ED PROVIDER NOTE - ATTENDING CONTRIBUTION TO CARE
Attending Statement: I have personally performed a face to face diagnostic evaluation on this patient. I have reviewed the ACP note and agree with the history, exam and plan of care, except as noted.     Attending Contribution to Care:  79 y/o female with a PMHx of DVT is here in the ED c/o left lower leg pain with mild swelling and no ttp. agree with PA exam, do not suspect fx/dislocation and soft compartment throughout with 2+ pulse and good cap refill. No chest pain or sob, do not suspect pe. Labs and VS wnml. US negative for dvt. Likely MSk pain. Pt stable for DC with outpt follow up.

## 2020-12-06 NOTE — ED PROVIDER NOTE - CLINICAL SUMMARY MEDICAL DECISION MAKING FREE TEXT BOX
81 y/o female with a PMHx of DVT is here in the ED c/o left lower leg pain with mild swelling. Will consider dvt, superficial thrombophlebitis, no injury and unremarkable exam, do not suspect fx/dislocation and soft compartment throughout with 2+ pulse and good cap refill. No chest pain or sob, do not suspect pe. 79 y/o female with a PMHx of DVT is here in the ED c/o left lower leg pain with mild swelling and no ttp. Will consider dvt, superficial thrombophlebitis, no injury and unremarkable exam, do not suspect fx/dislocation and soft compartment throughout with 2+ pulse and good cap refill. No chest pain or sob, do not suspect pe. Labs and VS wnml. US negative for dvt. Pt advised pre-liminary read and will call if changes occur. I have discussed the discharge plan with the patient. The patient agrees with the plan, as discussed.  The patient understands Emergency Department diagnosis is a preliminary diagnosis often based on limited information and that the patient must adhere to the follow-up plan as discussed.  The patient understands that if the symptoms worsen or if prescribed medications do not have the desired/planned effect that the patient may return to the Emergency Department at any time for further evaluation and treatment.

## 2021-01-28 ENCOUNTER — APPOINTMENT (OUTPATIENT)
Dept: ULTRASOUND IMAGING | Facility: HOSPITAL | Age: 81
End: 2021-01-28
Payer: MEDICARE

## 2021-01-28 ENCOUNTER — OUTPATIENT (OUTPATIENT)
Dept: OUTPATIENT SERVICES | Facility: HOSPITAL | Age: 81
LOS: 1 days | End: 2021-01-28
Payer: MEDICARE

## 2021-01-28 PROCEDURE — 76770 US EXAM ABDO BACK WALL COMP: CPT | Mod: 26

## 2021-01-28 PROCEDURE — 76770 US EXAM ABDO BACK WALL COMP: CPT

## 2021-03-22 ENCOUNTER — EMERGENCY (EMERGENCY)
Facility: HOSPITAL | Age: 81
LOS: 1 days | Discharge: HOME | End: 2021-03-22
Attending: EMERGENCY MEDICINE | Admitting: EMERGENCY MEDICINE
Payer: MEDICARE

## 2021-03-22 VITALS
TEMPERATURE: 98 F | SYSTOLIC BLOOD PRESSURE: 142 MMHG | RESPIRATION RATE: 16 BRPM | HEART RATE: 68 BPM | OXYGEN SATURATION: 98 % | DIASTOLIC BLOOD PRESSURE: 76 MMHG

## 2021-03-22 VITALS
DIASTOLIC BLOOD PRESSURE: 87 MMHG | TEMPERATURE: 98 F | SYSTOLIC BLOOD PRESSURE: 151 MMHG | OXYGEN SATURATION: 100 % | RESPIRATION RATE: 18 BRPM | HEART RATE: 71 BPM | WEIGHT: 160.06 LBS | HEIGHT: 69 IN

## 2021-03-22 DIAGNOSIS — Z90.710 ACQUIRED ABSENCE OF BOTH CERVIX AND UTERUS: ICD-10-CM

## 2021-03-22 DIAGNOSIS — Z86.718 PERSONAL HISTORY OF OTHER VENOUS THROMBOSIS AND EMBOLISM: ICD-10-CM

## 2021-03-22 DIAGNOSIS — Z91.041 RADIOGRAPHIC DYE ALLERGY STATUS: ICD-10-CM

## 2021-03-22 DIAGNOSIS — Z79.899 OTHER LONG TERM (CURRENT) DRUG THERAPY: ICD-10-CM

## 2021-03-22 DIAGNOSIS — Z79.01 LONG TERM (CURRENT) USE OF ANTICOAGULANTS: ICD-10-CM

## 2021-03-22 DIAGNOSIS — I10 ESSENTIAL (PRIMARY) HYPERTENSION: ICD-10-CM

## 2021-03-22 DIAGNOSIS — M79.602 PAIN IN LEFT ARM: ICD-10-CM

## 2021-03-22 DIAGNOSIS — M79.632 PAIN IN LEFT FOREARM: ICD-10-CM

## 2021-03-22 DIAGNOSIS — Z91.013 ALLERGY TO SEAFOOD: ICD-10-CM

## 2021-03-22 DIAGNOSIS — K21.9 GASTRO-ESOPHAGEAL REFLUX DISEASE WITHOUT ESOPHAGITIS: ICD-10-CM

## 2021-03-22 PROCEDURE — 93971 EXTREMITY STUDY: CPT | Mod: 26,LT

## 2021-03-22 PROCEDURE — 99284 EMERGENCY DEPT VISIT MOD MDM: CPT | Mod: 25

## 2021-03-22 PROCEDURE — 99284 EMERGENCY DEPT VISIT MOD MDM: CPT

## 2021-03-22 PROCEDURE — 93971 EXTREMITY STUDY: CPT

## 2021-03-22 RX ORDER — LORATADINE 10 MG/1
0 TABLET ORAL
Qty: 0 | Refills: 0 | DISCHARGE

## 2021-03-22 RX ORDER — AMLODIPINE BESYLATE 2.5 MG/1
1 TABLET ORAL
Qty: 0 | Refills: 0 | DISCHARGE

## 2021-03-22 RX ORDER — IBUPROFEN 200 MG
600 TABLET ORAL ONCE
Refills: 0 | Status: COMPLETED | OUTPATIENT
Start: 2021-03-22 | End: 2021-03-22

## 2021-03-22 RX ORDER — RANITIDINE HYDROCHLORIDE 150 MG/1
0 TABLET, FILM COATED ORAL
Qty: 0 | Refills: 0 | DISCHARGE

## 2021-03-22 NOTE — ED PROVIDER NOTE - PATIENT PORTAL LINK FT
You can access the FollowMyHealth Patient Portal offered by [facility] by registering at the following website: [email or web address]. By joining [employer]’s FollowMyHealth portal, you will also be able to view your health information using other applications (apps) compatible with our system.

## 2021-03-22 NOTE — ED PROVIDER NOTE - OBJECTIVE STATEMENT
80 F pmh htn, DVT LLE 2019 no longer on AC p/w LUE pain x 3 days.  pt reports cramping/dull pain in L forearm started on Saturday then improved slightly but today worse w/ radiating into upper arm- no chest pain/sob, numbness/weakness.  She took advil and applied otc analgesic cream with no improvement.  Denies f/c, headache, dizziness, fainting, neck pain, cough, congestion, abd pain, nvd, fall/trauma, skin changes, limited ROM

## 2021-03-22 NOTE — ED PROVIDER NOTE - CLINICAL SUMMARY MEDICAL DECISION MAKING FREE TEXT BOX
80 F pmh htn, DVT LLE 2019 no longer on AC p/w atraumatic LUE pain x 3 days.  on exam pt afebrile, well appearing, lungs ctab, hrrr, LUE: + ttp over diffuse forearm, no edema/swelling or skin changes, SILT, FROM all joints, radial pulse 2+.  likely msk pain but will obtain sonogram to r/o dvt.

## 2021-03-22 NOTE — ED ADULT TRIAGE NOTE - CHIEF COMPLAINT QUOTE
patient complains of left forearm pain since saturday. she states that it has been cramping from the elbow down and occasionally to the upper arm. denies chest pain, sob, fever, chills

## 2021-03-22 NOTE — ED PROVIDER NOTE - NSFOLLOWUPINSTRUCTIONS_ED_ALL_ED_FT
Take tylenol 650mg or motrin 400-800mg as needed every 4-6 hours for pain.   REST- Rest your hurting/injured joint or extremity to decrease pain and swelling for 24-48 hours    ICE- Apply ice to area of pain to decreased inflammation and pain, put towel/barrier between ice and skin. You can keep ice on for 20 minutes at a time 4-8 times daily   COMPRESSION- Wear ace wrap or brace for support to reduce swelling.  Make sure not to wrap too tight, loosen if skin feeling numb/tingling or skin turns blue   ELEVATION- Elevate hurting/injured area 6 or more inches about level of heart to decrease swelling/inflammation.  Use pillow under joint to elevate area    Please call to make appointment with primary care doctor within one week.    Return to ED if you have fever, swelling of arm, skin changes, numbness or weakness, worsening pain or other concerns    Strain    A strain is a stretch or tear in one of the muscles in your body. This is caused by an injury to the area such as a twisting mechanism. Symptoms include pain, swelling, or bruising. Rest that area over the next several days and slowly resume activity when tolerated. Ice can help with swelling and pain.     SEEK IMMEDIATE MEDICAL CARE IF YOU HAVE ANY OF THE FOLLOWING SYMPTOMS: worsening pain, inability to move that body part, numbness or tingling.

## 2021-03-22 NOTE — ED ADULT NURSE REASSESSMENT NOTE - NS ED NURSE REASSESS COMMENT FT1
US resulted, no arm pain c/o at this time, refused Ibuprofen PO, states not in pain.  Vital signs stable.  Discharged to home in stable condition.

## 2021-03-22 NOTE — ED ADULT NURSE NOTE - OBJECTIVE STATEMENT
80Y Female A&OX3 c/o intermittent Left ark pain "Like an ache that has come and cone for the last few days". Pt reports that pain can become a 5 on a scale of one to 10 but is a 2 at time of assessment. Denies known aggravator of pain "seems to come out of nowhere". Denies sob, fever, chills, cough, headache, might sweats, trauma to site. No deformity noted. Denies cardiac. Hx of left leg dvt.

## 2021-03-22 NOTE — ED PROVIDER NOTE - PHYSICAL EXAMINATION
Vitals reviewed  Gen: well appearing, nad, speaking in full sentences  Skin: wwp, no rash/lesions/erythema  HEENT: ncat, eomi, mmm  CV: rrr, no audible m/r/g  Resp: symmetrical expansion, ctab, no w/r/r  LUE: + ttp over diffuse forearm, no edema/swelling or skin changes, SILT, FROM all joints, radial pulse 2+, compartments soft  FROM all other ext, NVI  Neuro: alert/oriented, no focal deficits, steady gait

## 2021-07-30 ENCOUNTER — APPOINTMENT (OUTPATIENT)
Dept: VASCULAR SURGERY | Facility: CLINIC | Age: 81
End: 2021-07-30
Payer: MEDICARE

## 2021-07-30 PROCEDURE — 99203 OFFICE O/P NEW LOW 30 MIN: CPT

## 2021-08-03 NOTE — PHYSICAL EXAM
[Respiratory Effort] : normal respiratory effort [Normal Rate and Rhythm] : normal rate and rhythm [Alert] : alert [Oriented to Person] : oriented to person [Oriented to Place] : oriented to place [Oriented to Time] : oriented to time [Calm] : calm [2+] : left 2+ [Abdomen Tenderness] : ~T ~M No abdominal tenderness [de-identified] : Calm and cooperative  [de-identified] : NC/AT  [de-identified] : FROM

## 2021-08-03 NOTE — HISTORY OF PRESENT ILLNESS
[FreeTextEntry1] : 79 y/o F former smoker w/h/o HTN,kidney stones s/p basket extraction in 2017, HLD, pancreatic cysts followed by CT scans of the abdomen and incidental finding of infrarenal AAA 3.4 cm initially seen in 2017, CT scan in 2019 revealed AAA to measure 3.5 cm. Pt referred to see us by Dr. Chtao Tee. Pt c/o "circulatory problems" she has been experiencing tingling and numbness to her arms especially to her left arm. Also, she adds she may experience some muscle cramps and her feet may occasionally feel cold. She presents today to discuss findings on MRI of the abdomen w/and w/o contrast completed on 7/2/2021 at an outside hospital w/slight increase in AAA. complete report attached below. She is here to discuss MRI results and possible options. Otherwise she denies any abdominal pain, back pain, bladder or bowel dysfunction, no fever, chills, n/v/d. \par \par \par She takes baby aspirin daily.

## 2021-08-03 NOTE — ASSESSMENT
[Arterial/Venous Disease] : arterial/venous disease [Medication Management] : medication management [Aneurysm Surgery] : aneurysm surgery [FreeTextEntry1] : 79 y/o F former smoker w/h/o HTN,kidney stones s/p basket extraction in 2017, HLD, pancreatic cysts followed by CT scans of the abdomen and incidental finding of infrarenal AAA 3.4 cm initially seen in 2017, CT scan in 2019 revealed AAA to measure 3.5 cm. General exam benign. Discussed findings and treatment options. Pt reassured she has excellent circulation overall. Regarding abdominal AAA reassured pt aneurysm slowly growing. No indication for vascular surgery interventions. Will continue to monitor closely in 1 year.

## 2021-08-03 NOTE — DATA REVIEWED
[FreeTextEntry1] : MRI w/w/o contrast completed 7/2/2021 reveals infrarenal abdominal aneurysm measuring 3.7 cm. Stable and slowly growing from previous study in 2019 measuring 3.5 cm in 2019 and 3.4 in 2017.

## 2021-08-03 NOTE — ADDENDUM
[FreeTextEntry1] : This note was written by Martha Bernard on 07/30/2021 acting as scribe for Janie Gordon M.D.\par \par I, Dr.Vicken Rollins, personally performed the evaluation and management (E/M) services for this new patient.  That E/M includes conducting the initial examination, assessing all conditions, and establishing the plan of care.  Today, my ACP, OLIVIA Navarrete, was here to observe my evaluation and management services for this patient to be followed going forward.\par \par \par \par \par

## 2021-09-01 ENCOUNTER — APPOINTMENT (OUTPATIENT)
Dept: BREAST CENTER | Facility: CLINIC | Age: 81
End: 2021-09-01
Payer: MEDICARE

## 2021-09-01 ENCOUNTER — NON-APPOINTMENT (OUTPATIENT)
Age: 81
End: 2021-09-01

## 2021-09-01 VITALS
DIASTOLIC BLOOD PRESSURE: 71 MMHG | WEIGHT: 160 LBS | HEART RATE: 65 BPM | BODY MASS INDEX: 24.25 KG/M2 | SYSTOLIC BLOOD PRESSURE: 129 MMHG | HEIGHT: 68 IN

## 2021-09-01 DIAGNOSIS — Z87.448 PERSONAL HISTORY OF OTHER DISEASES OF URINARY SYSTEM: ICD-10-CM

## 2021-09-01 DIAGNOSIS — E78.5 HYPERLIPIDEMIA, UNSPECIFIED: ICD-10-CM

## 2021-09-01 DIAGNOSIS — K86.2 CYST OF PANCREAS: ICD-10-CM

## 2021-09-01 PROCEDURE — 99214 OFFICE O/P EST MOD 30 MIN: CPT

## 2021-09-01 PROCEDURE — 99204 OFFICE O/P NEW MOD 45 MIN: CPT

## 2021-09-01 RX ORDER — ATORVASTATIN CALCIUM 80 MG/1
TABLET, FILM COATED ORAL
Refills: 0 | Status: ACTIVE | COMMUNITY

## 2021-09-01 RX ORDER — ASPIRIN 81 MG
81 TABLET, DELAYED RELEASE (ENTERIC COATED) ORAL
Refills: 0 | Status: ACTIVE | COMMUNITY

## 2021-09-01 RX ORDER — RIVAROXABAN 2.5 MG/1
TABLET, FILM COATED ORAL
Refills: 0 | Status: DISCONTINUED | COMMUNITY
End: 2021-09-01

## 2022-03-09 ENCOUNTER — APPOINTMENT (OUTPATIENT)
Dept: BREAST CENTER | Facility: CLINIC | Age: 82
End: 2022-03-09
Payer: MEDICARE

## 2022-03-09 VITALS
WEIGHT: 160 LBS | SYSTOLIC BLOOD PRESSURE: 167 MMHG | BODY MASS INDEX: 23.7 KG/M2 | HEART RATE: 58 BPM | HEIGHT: 69 IN | DIASTOLIC BLOOD PRESSURE: 83 MMHG

## 2022-03-09 PROCEDURE — 99213 OFFICE O/P EST LOW 20 MIN: CPT

## 2022-06-29 ENCOUNTER — NON-APPOINTMENT (OUTPATIENT)
Age: 82
End: 2022-06-29

## 2022-07-05 ENCOUNTER — APPOINTMENT (OUTPATIENT)
Dept: OTOLARYNGOLOGY | Facility: CLINIC | Age: 82
End: 2022-07-05

## 2022-07-05 PROCEDURE — 99024 POSTOP FOLLOW-UP VISIT: CPT

## 2022-07-05 PROCEDURE — 69210 REMOVE IMPACTED EAR WAX UNI: CPT

## 2022-07-06 NOTE — HISTORY OF PRESENT ILLNESS
[de-identified] : 81F here for initial evaluation.\par \par For the past few weeks she c/o clogged ears. The left side is much worse and she uses OTC drops and they do not even get into the left ear. There is no otorrhea, otalgia, tinnitus or vertigo.\par She has had her ears cleaned in the past, but nothing recently.\par No other otolaryngologic complaints.\par \par ROS otherwise unremarkable.

## 2022-07-06 NOTE — PHYSICAL EXAM
[FreeTextEntry1] : Au: EAC occluded w cerumen (left>>>right) removed w curet. TM intact and mobile, ME clear\par \par immediate improvement in hearing after disimpaction [Midline] : trachea located in midline position [Normal] : no rashes

## 2022-07-06 NOTE — ASSESSMENT
[FreeTextEntry1] : 81F here for initial evaluation. For the past few weeks she c/o clogged ears. The left side is much worse and she uses OTC drops and they do not even get into the left ear. There is no otorrhea, otalgia, tinnitus or vertigo. She has had her ears cleaned in the past of cerumen, but nothing recently. On exam, both EACs were occluded w cerumen (left>>>right) which was removed with curet with immediate improvement in hearing after disimpaction. The rest of the head and neck exam is unremarkable.\par Cerumen removed w improvement in hearing-> RTO 6 months for ear exam and cleaning.

## 2022-07-06 NOTE — CONSULT LETTER
[Dear  ___] : Dear  [unfilled], [Courtesy Letter:] : I had the pleasure of seeing your patient, [unfilled], in my office today. [Consult Closing:] : Thank you very much for allowing me to participate in the care of this patient.  If you have any questions, please do not hesitate to contact me. [Sincerely,] : Sincerely, [FreeTextEntry3] : Sukumar Reno MD\par Department of Otolaryngology - Head and Neck Surgery\par [facility]

## 2022-07-12 ENCOUNTER — APPOINTMENT (OUTPATIENT)
Dept: VASCULAR SURGERY | Facility: CLINIC | Age: 82
End: 2022-07-12

## 2022-07-12 PROCEDURE — 99213 OFFICE O/P EST LOW 20 MIN: CPT

## 2022-07-12 PROCEDURE — 93978 VASCULAR STUDY: CPT

## 2022-07-18 NOTE — HISTORY OF PRESENT ILLNESS
[FreeTextEntry1] : 81yoF former smoker w/h/o HTN, kidney stones s/p basket extraction in 2017, HLD, pancreatic cysts followed by CT scans of the abdomen and incidental finding of infrarenal AAA 3.4 cm initially seen in 2017, CT scan in 2019 revealed AAA to measure 3.5 cm, returning today for surveillance.  She is compliant w/daily ASA and denies any symptoms related to her AAA.

## 2022-07-18 NOTE — PROCEDURE
[FreeTextEntry1] : Aortic duplex performed today to reevaluate aneurysm sac size reveals aneurysm measuring 3.7x3.5cm

## 2022-07-18 NOTE — ASSESSMENT
[Arterial/Venous Disease] : arterial/venous disease [Medication Management] : medication management [Aneurysm Surgery] : aneurysm surgery [FreeTextEntry1] : 81yoF former smoker w/h/o HTN, kidney stones s/p basket extraction in 2017, HLD, pancreatic cysts followed by CT scans of the abdomen and incidental finding of infrarenal AAA 3.4 cm initially seen in 2017, CT scan in 2019 revealed AAA to measure 3.5 cm, returning today for surveillance.  She is compliant w/daily ASA and denies any symptoms related to her AAA.\par \par Normal abd exam noted today, and aortic duplex performed today to reevaluate aneurysm sac size reveals aneurysm measuring 3.7x3.5cm.  Reassured pt that her aneurysm is slow-growing, and can be monitored w/duplex q6mos, but she should continue all medications until her next f/u.

## 2022-07-18 NOTE — PHYSICAL EXAM
[Respiratory Effort] : normal respiratory effort [Normal Rate and Rhythm] : normal rate and rhythm [2+] : left 2+ [Alert] : alert [Oriented to Person] : oriented to person [Oriented to Place] : oriented to place [Oriented to Time] : oriented to time [Calm] : calm [Abdomen Tenderness] : ~T ~M No abdominal tenderness [de-identified] : Calm and cooperative  [de-identified] : NC/AT  [de-identified] : FROM

## 2022-07-18 NOTE — ADDENDUM
[FreeTextEntry1] : This note was written by Hardy Roberts, acting as a scribe for Dr. Mary Santiago.  I, Dr. Mary Santiago, have read and attest that all the information, medical decision-making, and discharge instructions within are true and accurate.\par \par I, Dr. Mary Santiago, personally performed the evaluation and management (E/M) services for this established patient who presents today with (an) existing condition(s).  That E/M includes conducting the examination, assessing all conditions, and (re)establishing/reinforcing a plan of care.  Today, my ACP, Hardy Roberts, was here to observe my evaluation and management services for this condition to be followed going forward.

## 2022-09-16 ENCOUNTER — NON-APPOINTMENT (OUTPATIENT)
Age: 82
End: 2022-09-16

## 2022-09-29 ENCOUNTER — NON-APPOINTMENT (OUTPATIENT)
Age: 82
End: 2022-09-29

## 2023-01-10 ENCOUNTER — APPOINTMENT (OUTPATIENT)
Dept: VASCULAR SURGERY | Facility: CLINIC | Age: 83
End: 2023-01-10
Payer: MEDICARE

## 2023-01-10 VITALS
WEIGHT: 160 LBS | DIASTOLIC BLOOD PRESSURE: 76 MMHG | SYSTOLIC BLOOD PRESSURE: 160 MMHG | HEART RATE: 64 BPM | HEIGHT: 69 IN | BODY MASS INDEX: 23.7 KG/M2

## 2023-01-10 PROCEDURE — 99213 OFFICE O/P EST LOW 20 MIN: CPT

## 2023-01-10 PROCEDURE — 93978 VASCULAR STUDY: CPT

## 2023-01-12 NOTE — ASSESSMENT
[Arterial/Venous Disease] : arterial/venous disease [Medication Management] : medication management [Aneurysm Surgery] : aneurysm surgery [FreeTextEntry1] : 82yoF former smoker w/h/o HTN, kidney stones s/p basket extraction in 2017, HLD, pancreatic cysts followed by CT scans of the abdomen and incidental finding of infrarenal AAA 3.4 cm initially seen in 2017, CT scan in 2019 revealed AAA to measure 3.5 cm, returning today for surveillance.  She is compliant w/daily ASA and denies any symptoms related to her AAA.\par \par Normal abd exam noted today, and aortic duplex performed today to reevaluate aneurysm sac size reveals aneurysm measuring 3.9x3.8cm.  Reassured pt that her aneurysm is slow-growing, and can be monitored w/duplex q6mos, but she should continue all medications until her next f/u.

## 2023-01-12 NOTE — PROCEDURE
[FreeTextEntry1] : Aortic duplex performed today to reevaluate aneurysm sac size reveals aneurysm measuring 3.9x3.8cm

## 2023-01-12 NOTE — HISTORY OF PRESENT ILLNESS
[FreeTextEntry1] : 82yoF former smoker w/h/o HTN, kidney stones s/p basket extraction in 2017, HLD, pancreatic cysts followed by CT scans of the abdomen and incidental finding of infrarenal AAA 3.4 cm initially seen in 2017, CT scan in 2019 revealed AAA to measure 3.5 cm, returning today for surveillance.  She is compliant w/daily ASA and denies any symptoms related to her AAA.

## 2023-01-12 NOTE — PHYSICAL EXAM
[Respiratory Effort] : normal respiratory effort [Normal Rate and Rhythm] : normal rate and rhythm [2+] : left 2+ [Alert] : alert [Oriented to Person] : oriented to person [Oriented to Place] : oriented to place [Oriented to Time] : oriented to time [Calm] : calm [Abdomen Tenderness] : ~T ~M No abdominal tenderness [de-identified] : Calm and cooperative  [de-identified] : NC/AT  [de-identified] : FROM

## 2023-01-24 ENCOUNTER — NON-APPOINTMENT (OUTPATIENT)
Age: 83
End: 2023-01-24

## 2023-01-31 ENCOUNTER — APPOINTMENT (OUTPATIENT)
Dept: OTOLARYNGOLOGY | Facility: CLINIC | Age: 83
End: 2023-01-31
Payer: MEDICARE

## 2023-01-31 VITALS
DIASTOLIC BLOOD PRESSURE: 85 MMHG | HEART RATE: 60 BPM | WEIGHT: 160 LBS | HEIGHT: 69 IN | BODY MASS INDEX: 23.7 KG/M2 | SYSTOLIC BLOOD PRESSURE: 131 MMHG | TEMPERATURE: 95.7 F

## 2023-01-31 PROCEDURE — 69210 REMOVE IMPACTED EAR WAX UNI: CPT

## 2023-01-31 PROCEDURE — 99213 OFFICE O/P EST LOW 20 MIN: CPT | Mod: 25

## 2023-01-31 NOTE — HISTORY OF PRESENT ILLNESS
[de-identified] : 82F here in followup.\par \par She is doing well since last visit. Two months ago she had some right ear pain and took an OTC ear drop after which sx improved. She has some itching in the right ear. There is no otorrhea, otalgia, tinnitus or vertigo.\par She has had her ears cleaned in the past.\par No other otolaryngologic complaints.\par \par ROS otherwise unremarkable.

## 2023-01-31 NOTE — PHYSICAL EXAM
[Midline] : trachea located in midline position [Normal] : no rashes [FreeTextEntry1] : Au: EAC w thick cerumen removed w curet. TM intact and mobile, ME clear

## 2023-01-31 NOTE — ASSESSMENT
[FreeTextEntry1] : 82F here in followup. She is doing well since last visit. Two months ago she had some right ear pain and took an OTC ear drop after which sx improved. She has some itching in the right ear. There is no otorrhea, otalgia, tinnitus or vertigo. She has had her ears cleaned in the past. On exam, moderate cerumen was removed from both EACs. The rest of the head and neck exam is unremarkable.\par Cerumen removed w improvement. RTO 6 months for ear exam and cleaning.

## 2023-01-31 NOTE — CONSULT LETTER
[Dear  ___] : Dear  [unfilled], [Courtesy Letter:] : I had the pleasure of seeing your patient, [unfilled], in my office today. [Consult Closing:] : Thank you very much for allowing me to participate in the care of this patient.  If you have any questions, please do not hesitate to contact me. [Sincerely,] : Sincerely, [FreeTextEntry3] : Sukumar Reno MD\par Department of Otolaryngology - Head and Neck Surgery\par [facility]

## 2023-03-09 PROBLEM — R92.8 ABNORMAL FINDING ON RADIOLOGICAL EXAMINATION OF BREAST: Status: ACTIVE | Noted: 2022-03-09

## 2023-03-13 ENCOUNTER — APPOINTMENT (OUTPATIENT)
Dept: BREAST CENTER | Facility: CLINIC | Age: 83
End: 2023-03-13
Payer: MEDICARE

## 2023-03-13 VITALS
HEART RATE: 63 BPM | DIASTOLIC BLOOD PRESSURE: 80 MMHG | SYSTOLIC BLOOD PRESSURE: 162 MMHG | WEIGHT: 164 LBS | HEIGHT: 69 IN | BODY MASS INDEX: 24.29 KG/M2

## 2023-03-13 DIAGNOSIS — R92.8 OTHER ABNORMAL AND INCONCLUSIVE FINDINGS ON DIAGNOSTIC IMAGING OF BREAST: ICD-10-CM

## 2023-03-13 DIAGNOSIS — Z87.891 PERSONAL HISTORY OF NICOTINE DEPENDENCE: ICD-10-CM

## 2023-03-13 PROCEDURE — 99214 OFFICE O/P EST MOD 30 MIN: CPT

## 2023-07-11 ENCOUNTER — APPOINTMENT (OUTPATIENT)
Dept: VASCULAR SURGERY | Facility: CLINIC | Age: 83
End: 2023-07-11
Payer: MEDICARE

## 2023-07-11 VITALS
HEART RATE: 65 BPM | SYSTOLIC BLOOD PRESSURE: 143 MMHG | WEIGHT: 164 LBS | HEIGHT: 69 IN | DIASTOLIC BLOOD PRESSURE: 81 MMHG | BODY MASS INDEX: 24.29 KG/M2

## 2023-07-11 PROCEDURE — 93978 VASCULAR STUDY: CPT

## 2023-07-11 PROCEDURE — 99213 OFFICE O/P EST LOW 20 MIN: CPT

## 2023-07-13 NOTE — PROCEDURE
[FreeTextEntry1] : Aortic duplex performed today to reevaluate aneurysm sac size reveals aneurysm measuring 3.9x3.7cm w/o appreciable growth, but b/l CIAAs have grown slightly.

## 2023-07-13 NOTE — PHYSICAL EXAM
[Respiratory Effort] : normal respiratory effort [Normal Rate and Rhythm] : normal rate and rhythm [2+] : left 2+ [Alert] : alert [Oriented to Person] : oriented to person [Oriented to Place] : oriented to place [Oriented to Time] : oriented to time [Calm] : calm [Abdomen Tenderness] : ~T ~M No abdominal tenderness [de-identified] : Calm and cooperative  [de-identified] : NC/AT  [de-identified] : FROM

## 2023-07-13 NOTE — ASSESSMENT
[Arterial/Venous Disease] : arterial/venous disease [Medication Management] : medication management [Aneurysm Surgery] : aneurysm surgery [FreeTextEntry1] : 82yoF former smoker w/h/o HTN, kidney stones, HLD, pancreatic cysts, incidentally noted to have an iAAA measuring 3.4 cm on CT and 3.5cm on CT in 2018, returning for surveillance.  She is compliant w/daily ASA and denies any symptoms related to her AAA.\par \par Normal abd exam noted today, andAortic duplex performed today to reevaluate aneurysm sac size reveals aneurysm measuring 3.9x3.7cm w/o appreciable growth, but b/l CIAAs have grown slightly..  Reassured pt that her aneurysm is slow-growing, and can be monitored w/duplex q6mos, but she should continue all medications until her next f/u; if aneurysmal growth is again noted, will plan for CTA a/p to verify.

## 2023-07-13 NOTE — HISTORY OF PRESENT ILLNESS
[FreeTextEntry1] : 82yoF former smoker w/h/o HTN, kidney stones, HLD, pancreatic cysts, incidentally noted to have an iAAA measuring 3.4 cm on CT and 3.5cm on CT in 2018, returning for surveillance.  She is compliant w/daily ASA and denies any symptoms related to her AAA.

## 2023-09-05 ENCOUNTER — APPOINTMENT (OUTPATIENT)
Dept: OTOLARYNGOLOGY | Facility: CLINIC | Age: 83
End: 2023-09-05
Payer: MEDICARE

## 2023-09-05 DIAGNOSIS — H61.23 IMPACTED CERUMEN, BILATERAL: ICD-10-CM

## 2023-09-05 PROCEDURE — 69210 REMOVE IMPACTED EAR WAX UNI: CPT

## 2023-09-05 PROCEDURE — 99213 OFFICE O/P EST LOW 20 MIN: CPT | Mod: 25

## 2023-09-05 NOTE — ASSESSMENT
[FreeTextEntry1] : 82F here in followup. She is doing well since last visit 7 months ago. There is no otorrhea, otalgia, tinnitus or vertigo. She has had her ears cleaned in the past. On exam, a mild/moderate cerumen was removed from both EACs. The rest of the head and neck exam is unremarkable. Cerumen removed w improvement. RTO 12 months for ear exam and cleaning.

## 2023-09-05 NOTE — CONSULT LETTER
[Dear  ___] : Dear  [unfilled], [Courtesy Letter:] : I had the pleasure of seeing your patient, [unfilled], in my office today. [Consult Closing:] : Thank you very much for allowing me to participate in the care of this patient.  If you have any questions, please do not hesitate to contact me. [Sincerely,] : Sincerely, [FreeTextEntry3] : Sukumar Reno MD\par  Department of Otolaryngology - Head and Neck Surgery\par  [facility]

## 2023-09-05 NOTE — HISTORY OF PRESENT ILLNESS
[de-identified] : 82F here in followup.  She is doing well since last visit. There is no otorrhea, otalgia, tinnitus or vertigo. She has had her ears cleaned in the past. No other otolaryngologic complaints.  ROS otherwise unremarkable.

## 2024-01-16 ENCOUNTER — APPOINTMENT (OUTPATIENT)
Dept: VASCULAR SURGERY | Facility: CLINIC | Age: 84
End: 2024-01-16
Payer: MEDICARE

## 2024-01-16 VITALS
WEIGHT: 164 LBS | BODY MASS INDEX: 24.29 KG/M2 | HEIGHT: 69 IN | SYSTOLIC BLOOD PRESSURE: 127 MMHG | HEART RATE: 60 BPM | DIASTOLIC BLOOD PRESSURE: 76 MMHG

## 2024-01-16 DIAGNOSIS — I71.40 ABDOMINAL AORTIC ANEURYSM, WITHOUT RUPTURE, UNSPECIFIED: ICD-10-CM

## 2024-01-16 PROCEDURE — 99213 OFFICE O/P EST LOW 20 MIN: CPT

## 2024-01-16 PROCEDURE — 93978 VASCULAR STUDY: CPT

## 2024-01-16 NOTE — PROCEDURE
[FreeTextEntry1] : Aortic duplex performed today to reevaluate aneurysm sac size reveals aneurysm measuring 3.9x3.7cm w/o appreciable growth, CIAAs also stable in size.

## 2024-01-16 NOTE — PHYSICAL EXAM
[Respiratory Effort] : normal respiratory effort [Normal Rate and Rhythm] : normal rate and rhythm [2+] : left 2+ [Alert] : alert [Oriented to Person] : oriented to person [Oriented to Place] : oriented to place [Oriented to Time] : oriented to time [Calm] : calm [Abdomen Tenderness] : ~T ~M No abdominal tenderness [de-identified] : Calm and cooperative  [de-identified] : NC/AT  [de-identified] : FROM

## 2024-01-16 NOTE — HISTORY OF PRESENT ILLNESS
[FreeTextEntry1] : 83yoF former smoker w/h/o HTN, kidney stones, HLD, pancreatic cysts, incidentally noted to have an iAAA measuring 3.4 cm on CT and 3.5cm on CT in 2018, returning for surveillance.  She is compliant w/daily ASA and denies any symptoms related to her AAA.

## 2024-01-16 NOTE — ASSESSMENT
[Arterial/Venous Disease] : arterial/venous disease [Medication Management] : medication management [Aneurysm Surgery] : aneurysm surgery [FreeTextEntry1] : 83yoF former smoker w/h/o HTN, kidney stones, HLD, pancreatic cysts, incidentally noted to have an iAAA measuring 3.4 cm on CT and 3.5cm on CT in 2018, returning for surveillance.  She is compliant w/daily ASA and denies any symptoms related to her AAA.  Normal abdominal exam noted today and aortic duplex performed today to reevaluate aneurysm sac size reveals aneurysm measuring 3.9x3.7cm w/o appreciable growth, CIAAs also stable in size.  Instructed pt to continue all home meds and RTO in 1y for surveillance or sooner if abdominal/back symptoms related to the aneurysm develop.

## 2024-03-13 ENCOUNTER — NON-APPOINTMENT (OUTPATIENT)
Age: 84
End: 2024-03-13

## 2024-03-18 ENCOUNTER — APPOINTMENT (OUTPATIENT)
Dept: HEMATOLOGY ONCOLOGY | Facility: CLINIC | Age: 84
End: 2024-03-18

## 2024-03-18 ENCOUNTER — APPOINTMENT (OUTPATIENT)
Dept: BREAST CENTER | Facility: CLINIC | Age: 84
End: 2024-03-18
Payer: MEDICARE

## 2024-03-18 VITALS
DIASTOLIC BLOOD PRESSURE: 84 MMHG | WEIGHT: 164 LBS | SYSTOLIC BLOOD PRESSURE: 172 MMHG | BODY MASS INDEX: 24.29 KG/M2 | HEART RATE: 60 BPM | HEIGHT: 69 IN

## 2024-03-18 DIAGNOSIS — Z12.39 ENCOUNTER FOR OTHER SCREENING FOR MALIGNANT NEOPLASM OF BREAST: ICD-10-CM

## 2024-03-18 DIAGNOSIS — Z80.9 FAMILY HISTORY OF MALIGNANT NEOPLASM, UNSPECIFIED: ICD-10-CM

## 2024-03-18 DIAGNOSIS — N60.19 DIFFUSE CYSTIC MASTOPATHY OF UNSPECIFIED BREAST: ICD-10-CM

## 2024-03-18 DIAGNOSIS — Z78.9 OTHER SPECIFIED HEALTH STATUS: ICD-10-CM

## 2024-03-18 PROCEDURE — 99214 OFFICE O/P EST MOD 30 MIN: CPT

## 2024-03-18 RX ORDER — OFLOXACIN 3 MG/ML
0.3 SOLUTION/ DROPS OPHTHALMIC
Qty: 5 | Refills: 0 | Status: DISCONTINUED | COMMUNITY
Start: 2022-02-08 | End: 2024-03-18

## 2024-03-18 RX ORDER — KETOROLAC TROMETHAMINE 5 MG/ML
0.5 SOLUTION OPHTHALMIC
Qty: 5 | Refills: 0 | Status: DISCONTINUED | COMMUNITY
Start: 2022-02-08 | End: 2024-03-18

## 2024-03-18 RX ORDER — PREDNISOLONE ACETATE 10 MG/ML
1 SUSPENSION/ DROPS OPHTHALMIC
Qty: 15 | Refills: 0 | Status: DISCONTINUED | COMMUNITY
Start: 2022-02-08 | End: 2024-03-18

## 2024-03-18 NOTE — DISCUSSION/SUMMARY
[FreeTextEntry1] : REASON FOR CONSULT Amber Ravi is an 83-year-old female who was referred by Dr. Shu Pittman for cancer genetic counseling and risk assessment due to a family history of breast cancer. She was accompanied by her daughter.  RELEVANT MEDICAL HISTORY Ms. Ravi is a healthy individual who has never had cancer. She has a family history of cancer, see below.  OTHER MEDICAL AND SURGICAL HISTORY: -	Medical History: hypertension, history of a DVT, abdominal aortic aneurysm -	Surgical History: hysterectomy and bilateral salpingo-oophorectomy (53 years old; due to cysts), kidney stone removal, right breast excisional biopsy   PAST OB/GYN HISTORY: Obstetrical History:  Age at Menarche: 11 Menopausal with LMP at age 53  Age at First Live Birth: 19 Oral Contraceptive Use: No Hormone Replacement Therapy: Yes, patient reports taking hormonal pills, unsure of what type, for approximately 6 months in her 50's.   CANCER SCREENING HISTORY:   Breast:  -	Mammography: last 2024, benign findings -	Sonography: last 2024, benign findings -	MRI: No -	Biopsies: Patient reports undergoing a right breast excisional biopsy which was reportedly benign.  GYN: -	Pelvic Examination: last at 53 years old, reportedly normal  -	Sonography: No -	CA-125: No Colon: -	Colonoscopy: last 3-4 years ago, reportedly normal. Next colonoscopy was recommended in 5 years. Patient reports a history of colorectal polyps on her first colonoscopy, but she reports that she is unsure how many polyps were identified. -	Upper Endoscopy: No Skin:   -	FBSE: last 2-3 years ago, reportedly normal  -	Lesions biopsied/removed: Yes, patient reports having a lesion excised near her eye that was benign.   SOCIAL HISTORY: -	Tobacco-product use: Yes, former (quit >20 years ago)  FAMILY HISTORY: Maternal ancestry and paternal ancestry were reported as American. Ashkenazi Mormon ancestry and consanguinity were denied. A detailed family history of cancer was ascertained. Relevant diagnoses are detailed below and in the scanned pedigree.   Of note, Ms. Ravi's daughter who was diagnosed with breast cancer at 64 years old underwent genetic testing approximately 3 months ago which was reportedly negative. Report was not available for review at today's appointment.  	 	RISK ASSESSMENT: Ms. Ravi's family history of breast cancer is suggestive of an inherited predisposition to breast cancer and related cancers. We recommended genetic testing for genes associated with breast and gynecological cancers. However, we discussed that Ms. Ravi does not meet genetic testing criteria for Medicare and Medicare will not cover the cost of genetic testing. We discussed options for proceeding with the abovementioned genetic testing using the self-pay option ($249) offered at [facility].   We discussed the risks, benefits and limitations, and implications of genetic testing. We also discussed the psychosocial implications of genetic testing. Possible test results were reviewed with Ms. Ravi, along with associated medical management options.   Following our discussion, Ms. Ravi deferred genetic testing due to cost. She stated that she will recontact [facility] if she would like to pursue genetic testing. Ms. Ravi was made aware that we remain available to her should she have additional questions or want to proceed with genetic testing.   PLAN:  1.	Ms. Ravi deferred genetic testing today. She will reach out to [facility] if she would like to proceed with genetic testing in the future.   For any additional questions please call [facility] at [phone].    Nicolasa Haddad MS, [facility], [facility]   CC:  Dr. Shu Pittman

## 2024-03-18 NOTE — PAST MEDICAL HISTORY
[Surgical Menopause] : The patient is in surgical menopause [Menarche Age ____] : age at menarche was [unfilled] [Menopause Age____] : age at menopause was [unfilled] [History of Hormone Replacement Treatment] : has a history of hormone replacement treatment [Total Preg ___] : G[unfilled] [Live Births ___] : P[unfilled]  [Age At Live Birth ___] : Age at live birth: [unfilled] [de-identified] : hysterectomy  [FreeTextEntry5] : hysterectomy   [FreeTextEntry6] : n/a [FreeTextEntry7] : n/a [FreeTextEntry8] : n/a

## 2024-03-18 NOTE — HISTORY OF PRESENT ILLNESS
[FreeTextEntry1] : Patient is a 82 yo F here for breast cancer screening. Followed for LEFT breast probably benign cyst seen on screening imaging 6/2021. Family hx of breast cancer in daughter (genetics negative, age 64) diagnosed 2022 and sisters x2 (age 50s). No known genetic testing. Patient denies palpable masses, skin changes, or nipple discharge bilaterally. Of note, patient is followed by vascular for known AAA since 2018.  3/4/21: B/L MG & US- Scattered. Vascular calcs. US- R 0.7cm 4:00, 1FN stable mass. R 0.5cm 11:00, 2FN hypoechoic nodule. L 1.3cm 1:00 RA circumscribed hypoechoic mass w/ spongiform internal cystic change, likely a cluster of microcysts and benign (Rec. 6 mo f/u US). L 0.4cm 2:00, 6FN hypoechoic nodule, likely a complicated cyst. BIRADS 3  6/26/21: L US- L 1.3cm 1:00 RA complicated cyst, stable, probably benign. BIRDS 3. 3/9/22: B/l MG & US- heterogenously dense. On US- R stable 0.9cm hypoechoic mass 4:00 1FN. L stable 1.3cm cluster of cyst 1:00 retroareolar (rec 6m f/u). L stable 0.3cm complicated cyst 2:00 6FN. BI-RADS 3 9/16/22 L US- 1.5cm stable benign-appearing cluster of cysts/area fibrocystic change 1:00 retroareolar. Rec f/u at time of annual imaging. BI-RADS 3.  3/13/23: B/L MG & US- scattered fibroglandular. R tissue markers x2. US- B/l stable masses. BI-RADS 2 3/18/24: B/L MG & US- Benign findings. EMILY. BIRADS 2.

## 2024-03-18 NOTE — PHYSICAL EXAM
[EOMI] : extra ocular movement intact [Normocephalic] : normocephalic [Supple] : supple [No Supraclavicular Adenopathy] : no supraclavicular adenopathy [No Cervical Adenopathy] : no cervical adenopathy [de-identified] : Bilateral breast/axilla/supraclavicular area: No masses, discharge, or adenopathy\par

## 2024-08-18 ENCOUNTER — NON-APPOINTMENT (OUTPATIENT)
Age: 84
End: 2024-08-18

## 2024-09-05 ENCOUNTER — APPOINTMENT (OUTPATIENT)
Dept: OTOLARYNGOLOGY | Facility: CLINIC | Age: 84
End: 2024-09-05
Payer: MEDICARE

## 2024-09-05 DIAGNOSIS — H92.03 OTALGIA, BILATERAL: ICD-10-CM

## 2024-09-05 DIAGNOSIS — H61.23 IMPACTED CERUMEN, BILATERAL: ICD-10-CM

## 2024-09-05 PROCEDURE — 69210 REMOVE IMPACTED EAR WAX UNI: CPT

## 2024-09-05 PROCEDURE — 99213 OFFICE O/P EST LOW 20 MIN: CPT | Mod: 25

## 2024-09-05 NOTE — HISTORY OF PRESENT ILLNESS
[de-identified] : 83F here in followup.  She is doing well since last seen 1 yr ago. She had ear pain/discomfort and has been using a variety of OTC drop which seem clean her ears. There is no otorrhea, otalgia, tinnitus or vertigo. She has had her ears cleaned in the past. No other otolaryngologic complaints.  ROS otherwise unremarkable.

## 2024-09-05 NOTE — CONSULT LETTER
[Dear  ___] : Dear  [unfilled], [Courtesy Letter:] : I had the pleasure of seeing your patient, [unfilled], in my office today. [Consult Closing:] : Thank you very much for allowing me to participate in the care of this patient.  If you have any questions, please do not hesitate to contact me. [Sincerely,] : Sincerely, [FreeTextEntry3] : Sukumar Reno MD\par  Department of Otolaryngology - Head and Neck Surgery\par  [facility]

## 2024-09-05 NOTE — PHYSICAL EXAM
[de-identified] : soft, no mass/lesion [de-identified] : no pain [Midline] : trachea located in midline position [Normal] : no rashes [FreeTextEntry1] : Au: EAC (L>R) w mild cerumen removed w curet. TM intact and mobile, ME clear

## 2024-09-05 NOTE — DATA REVIEWED
[No studies available for review at this time] : No studies available for review at this time
Skin normal color for race, warm, dry and intact. No evidence of rash.

## 2024-09-05 NOTE — ASSESSMENT
[FreeTextEntry1] : 83F here in followup. She is doing well since last visit 12 months ago. There is no otorrhea, otalgia, tinnitus or vertigo. She has had her ears cleaned in the past and wants to ensure there is no cerumen. On exam, mild cerumen was removed from both EACs. The rest of the head and neck exam is unremarkable. Cerumen removed w improvement. RTO 12 months for ear exam and cleaning.

## 2025-01-14 ENCOUNTER — APPOINTMENT (OUTPATIENT)
Dept: VASCULAR SURGERY | Facility: CLINIC | Age: 85
End: 2025-01-14

## 2025-01-14 VITALS
SYSTOLIC BLOOD PRESSURE: 150 MMHG | BODY MASS INDEX: 24.29 KG/M2 | HEART RATE: 69 BPM | DIASTOLIC BLOOD PRESSURE: 82 MMHG | WEIGHT: 164 LBS | HEIGHT: 69 IN

## 2025-01-14 DIAGNOSIS — I71.40 ABDOMINAL AORTIC ANEURYSM, WITHOUT RUPTURE, UNSPECIFIED: ICD-10-CM

## 2025-01-14 PROCEDURE — 99213 OFFICE O/P EST LOW 20 MIN: CPT

## 2025-01-14 PROCEDURE — 93978 VASCULAR STUDY: CPT

## 2025-01-21 ENCOUNTER — NON-APPOINTMENT (OUTPATIENT)
Age: 85
End: 2025-01-21

## 2025-03-12 ENCOUNTER — NON-APPOINTMENT (OUTPATIENT)
Age: 85
End: 2025-03-12

## 2025-03-19 ENCOUNTER — APPOINTMENT (OUTPATIENT)
Dept: BREAST CENTER | Facility: CLINIC | Age: 85
End: 2025-03-19
Payer: MEDICARE

## 2025-03-19 VITALS
HEART RATE: 56 BPM | WEIGHT: 161 LBS | SYSTOLIC BLOOD PRESSURE: 144 MMHG | DIASTOLIC BLOOD PRESSURE: 83 MMHG | BODY MASS INDEX: 23.78 KG/M2

## 2025-03-19 DIAGNOSIS — Z12.39 ENCOUNTER FOR OTHER SCREENING FOR MALIGNANT NEOPLASM OF BREAST: ICD-10-CM

## 2025-03-19 PROCEDURE — 99213 OFFICE O/P EST LOW 20 MIN: CPT

## 2025-07-15 ENCOUNTER — APPOINTMENT (OUTPATIENT)
Dept: VASCULAR SURGERY | Facility: CLINIC | Age: 85
End: 2025-07-15
Payer: MEDICARE

## 2025-07-15 ENCOUNTER — NON-APPOINTMENT (OUTPATIENT)
Age: 85
End: 2025-07-15

## 2025-07-15 VITALS
BODY MASS INDEX: 23.85 KG/M2 | HEIGHT: 69 IN | WEIGHT: 161 LBS | SYSTOLIC BLOOD PRESSURE: 149 MMHG | HEART RATE: 67 BPM | DIASTOLIC BLOOD PRESSURE: 83 MMHG

## 2025-07-15 PROCEDURE — 93978 VASCULAR STUDY: CPT

## 2025-07-15 PROCEDURE — 99213 OFFICE O/P EST LOW 20 MIN: CPT
